# Patient Record
Sex: FEMALE | Race: BLACK OR AFRICAN AMERICAN | Employment: FULL TIME | URBAN - METROPOLITAN AREA
[De-identification: names, ages, dates, MRNs, and addresses within clinical notes are randomized per-mention and may not be internally consistent; named-entity substitution may affect disease eponyms.]

---

## 2017-02-07 ENCOUNTER — APPOINTMENT (OUTPATIENT)
Dept: ULTRASOUND IMAGING | Facility: HOSPITAL | Age: 62
End: 2017-02-07
Attending: EMERGENCY MEDICINE
Payer: COMMERCIAL

## 2017-02-07 ENCOUNTER — HOSPITAL ENCOUNTER (EMERGENCY)
Facility: HOSPITAL | Age: 62
Discharge: HOME | End: 2017-02-07
Attending: EMERGENCY MEDICINE
Payer: COMMERCIAL

## 2017-02-07 VITALS
RESPIRATION RATE: 14 BRPM | WEIGHT: 224 LBS | HEIGHT: 62 IN | HEART RATE: 72 BPM | TEMPERATURE: 99 F | DIASTOLIC BLOOD PRESSURE: 65 MMHG | SYSTOLIC BLOOD PRESSURE: 130 MMHG | OXYGEN SATURATION: 99 % | BODY MASS INDEX: 41.22 KG/M2

## 2017-02-07 DIAGNOSIS — M25.511 RIGHT SHOULDER PAIN, UNSPECIFIED CHRONICITY: Primary | ICD-10-CM

## 2017-02-07 PROCEDURE — 93971 EXTREMITY STUDY: CPT

## 2017-02-07 PROCEDURE — 99284 EMERGENCY DEPT VISIT MOD MDM: CPT

## 2017-02-07 NOTE — ED INITIAL ASSESSMENT (HPI)
Pt presents today with discomfort above her right breast in her subclavian area. She had a Port in place that was removed last Thursday and she said that she was on Lovenox and then they switched over to the Xarelto PO on the following day.  Pt rec'd chemo

## 2017-02-07 NOTE — ED PROVIDER NOTES
Patient Seen in: [facility] Emergency Department    History   Patient presents with:  Deep Vein Thrombosis (cardiovascular)    Stated Complaint:     HPI    Patient is a pleasant 69-year-old female, presenting for evaluation of possible DVT in her ri needed for pain. PACLitaxel (TAXOL IV),  Inject into the vein every 21 days. CARBOPLATIN IV,  Inject into the vein.   magnesium hydroxide (MILK OF MAGNESIA) 400 MG/5ML Oral Suspension,  Take 30 mL by mouth daily as needed for constipation.        Family Skin is pink warm and dry. There are no rashes. EXTREMITIES: The patient moves all 4 extremities freely. No cyanosis, clubbing, or edema. NEUROLOGIC: The patient is awake, alert, and oriented x3. Cranial nerves are grossly intact.  There is no gross mo discussed. Patient verbalizes understanding, ambulated freely, and was subsequently discharged without incident.     Disposition and Plan     Clinical Impression:  Right shoulder pain, unspecified chronicity  (primary encounter diagnosis)    Disposition:

## 2018-01-01 ENCOUNTER — APPOINTMENT (OUTPATIENT)
Dept: CT IMAGING | Facility: HOSPITAL | Age: 63
End: 2018-01-01
Attending: EMERGENCY MEDICINE
Payer: COMMERCIAL

## 2018-01-01 ENCOUNTER — HOSPITAL ENCOUNTER (EMERGENCY)
Facility: HOSPITAL | Age: 63
Discharge: HOME | End: 2018-01-01
Attending: EMERGENCY MEDICINE
Payer: COMMERCIAL

## 2018-01-01 VITALS
HEART RATE: 98 BPM | TEMPERATURE: 97 F | SYSTOLIC BLOOD PRESSURE: 108 MMHG | BODY MASS INDEX: 38 KG/M2 | RESPIRATION RATE: 16 BRPM | WEIGHT: 209 LBS | OXYGEN SATURATION: 99 % | DIASTOLIC BLOOD PRESSURE: 68 MMHG

## 2018-01-01 DIAGNOSIS — R18.0 ASCITES, MALIGNANT: ICD-10-CM

## 2018-01-01 DIAGNOSIS — R63.0 ANOREXIA: ICD-10-CM

## 2018-01-01 DIAGNOSIS — C54.1 ENDOMETRIAL CANCER (HCC): Primary | ICD-10-CM

## 2018-01-01 PROCEDURE — 99285 EMERGENCY DEPT VISIT HI MDM: CPT

## 2018-01-01 PROCEDURE — 74177 CT ABD & PELVIS W/CONTRAST: CPT | Performed by: EMERGENCY MEDICINE

## 2018-01-01 PROCEDURE — 81001 URINALYSIS AUTO W/SCOPE: CPT | Performed by: INTERNAL MEDICINE

## 2018-01-01 PROCEDURE — 99284 EMERGENCY DEPT VISIT MOD MDM: CPT

## 2018-01-01 PROCEDURE — 36415 COLL VENOUS BLD VENIPUNCTURE: CPT

## 2018-01-01 PROCEDURE — 87493 C DIFF AMPLIFIED PROBE: CPT | Performed by: INTERNAL MEDICINE

## 2018-01-01 PROCEDURE — 87045 FECES CULTURE AEROBIC BACT: CPT | Performed by: INTERNAL MEDICINE

## 2018-01-01 PROCEDURE — 87046 STOOL CULTR AEROBIC BACT EA: CPT | Performed by: INTERNAL MEDICINE

## 2018-01-01 PROCEDURE — 87427 SHIGA-LIKE TOXIN AG IA: CPT | Performed by: INTERNAL MEDICINE

## 2018-01-01 PROCEDURE — 80053 COMPREHEN METABOLIC PANEL: CPT | Performed by: EMERGENCY MEDICINE

## 2018-01-01 PROCEDURE — 85025 COMPLETE CBC W/AUTO DIFF WBC: CPT | Performed by: EMERGENCY MEDICINE

## 2018-01-01 RX ORDER — SUCRALFATE ORAL 1 G/10ML
1 SUSPENSION ORAL
Qty: 420 ML | Refills: 0 | Status: SHIPPED | OUTPATIENT
Start: 2018-01-01 | End: 2019-01-01

## 2018-11-24 NOTE — ED PROVIDER NOTES
Patient Seen in: [facility] Emergency Department    History   Patient presents with:  Dehydration (metabolic/constitutional)    Stated Complaint: dehydaration, abdominal distention    HPI    39-year-old female presents emergency department who has n signs reviewed. All other systems reviewed and negative except as noted above.     Physical Exam     ED Triage Vitals   BP 11/23/18 1613 104/78   Pulse 11/23/18 1601 118   Resp 11/23/18 1601 20   Temp 11/23/18 1601 97.3 °F (36.3 °C)   Temp src 11/23/18 Abnormality         Status                     ---------                               -----------         ------                     CBC W/ DIFFERENTIAL[[id number]]          Abnormal            Final result                 Please v Radha 329 [phone]              Medications Prescribed:  Current Discharge Medication List    START taking these medications    sucralfate 1 GM/10ML Oral Suspension  Take 10 mL (1 g total) by mouth 4 (four) times daily before meals and

## 2019-01-01 ENCOUNTER — APPOINTMENT (OUTPATIENT)
Dept: GENERAL RADIOLOGY | Facility: HOSPITAL | Age: 64
DRG: 186 | End: 2019-01-01
Attending: RADIOLOGY
Payer: COMMERCIAL

## 2019-01-01 ENCOUNTER — APPOINTMENT (OUTPATIENT)
Dept: GENERAL RADIOLOGY | Facility: HOSPITAL | Age: 64
DRG: 186 | End: 2019-01-01
Attending: EMERGENCY MEDICINE
Payer: COMMERCIAL

## 2019-01-01 ENCOUNTER — APPOINTMENT (OUTPATIENT)
Dept: GENERAL RADIOLOGY | Facility: HOSPITAL | Age: 64
DRG: 186 | End: 2019-01-01
Attending: INTERNAL MEDICINE
Payer: COMMERCIAL

## 2019-01-01 ENCOUNTER — NURSE ONLY (OUTPATIENT)
Dept: LAB | Age: 64
End: 2019-01-01
Attending: FAMILY MEDICINE
Payer: COMMERCIAL

## 2019-01-01 ENCOUNTER — APPOINTMENT (OUTPATIENT)
Dept: GENERAL RADIOLOGY | Facility: HOSPITAL | Age: 64
DRG: 754 | End: 2019-01-01
Attending: INTERNAL MEDICINE
Payer: COMMERCIAL

## 2019-01-01 ENCOUNTER — APPOINTMENT (OUTPATIENT)
Dept: CV DIAGNOSTICS | Facility: HOSPITAL | Age: 64
DRG: 186 | End: 2019-01-01
Attending: INTERNAL MEDICINE
Payer: COMMERCIAL

## 2019-01-01 ENCOUNTER — SNF VISIT (OUTPATIENT)
Dept: INTERNAL MEDICINE CLINIC | Age: 64
End: 2019-01-01

## 2019-01-01 ENCOUNTER — HOSPITAL ENCOUNTER (INPATIENT)
Facility: HOSPITAL | Age: 64
LOS: 19 days | Discharge: HOSPICE | DRG: 186 | End: 2019-01-01
Attending: EMERGENCY MEDICINE | Admitting: INTERNAL MEDICINE
Payer: COMMERCIAL

## 2019-01-01 ENCOUNTER — APPOINTMENT (OUTPATIENT)
Dept: GENERAL RADIOLOGY | Facility: HOSPITAL | Age: 64
DRG: 754 | End: 2019-01-01
Attending: EMERGENCY MEDICINE
Payer: COMMERCIAL

## 2019-01-01 ENCOUNTER — APPOINTMENT (OUTPATIENT)
Dept: ULTRASOUND IMAGING | Facility: HOSPITAL | Age: 64
DRG: 754 | End: 2019-01-01
Attending: INTERNAL MEDICINE
Payer: COMMERCIAL

## 2019-01-01 ENCOUNTER — TELEPHONE (OUTPATIENT)
Dept: HEMATOLOGY/ONCOLOGY | Facility: HOSPITAL | Age: 64
End: 2019-01-01

## 2019-01-01 ENCOUNTER — INITIAL APN SNF VISIT (OUTPATIENT)
Dept: INTERNAL MEDICINE CLINIC | Age: 64
End: 2019-01-01

## 2019-01-01 ENCOUNTER — APPOINTMENT (OUTPATIENT)
Dept: ULTRASOUND IMAGING | Facility: HOSPITAL | Age: 64
DRG: 754 | End: 2019-01-01
Attending: HOSPITALIST
Payer: COMMERCIAL

## 2019-01-01 ENCOUNTER — APPOINTMENT (OUTPATIENT)
Dept: ULTRASOUND IMAGING | Facility: HOSPITAL | Age: 64
DRG: 186 | End: 2019-01-01
Attending: INTERNAL MEDICINE
Payer: COMMERCIAL

## 2019-01-01 ENCOUNTER — APPOINTMENT (OUTPATIENT)
Dept: INTERVENTIONAL RADIOLOGY/VASCULAR | Facility: HOSPITAL | Age: 64
DRG: 186 | End: 2019-01-01
Attending: INTERNAL MEDICINE
Payer: COMMERCIAL

## 2019-01-01 ENCOUNTER — APPOINTMENT (OUTPATIENT)
Dept: CT IMAGING | Facility: HOSPITAL | Age: 64
DRG: 186 | End: 2019-01-01
Attending: EMERGENCY MEDICINE
Payer: COMMERCIAL

## 2019-01-01 ENCOUNTER — APPOINTMENT (OUTPATIENT)
Dept: CV DIAGNOSTICS | Facility: HOSPITAL | Age: 64
DRG: 808 | End: 2019-01-01
Attending: HOSPITALIST
Payer: COMMERCIAL

## 2019-01-01 ENCOUNTER — APPOINTMENT (OUTPATIENT)
Dept: ULTRASOUND IMAGING | Facility: HOSPITAL | Age: 64
DRG: 808 | End: 2019-01-01
Attending: HOSPITALIST
Payer: COMMERCIAL

## 2019-01-01 ENCOUNTER — APPOINTMENT (OUTPATIENT)
Dept: GENERAL RADIOLOGY | Facility: HOSPITAL | Age: 64
DRG: 808 | End: 2019-01-01
Attending: EMERGENCY MEDICINE
Payer: COMMERCIAL

## 2019-01-01 ENCOUNTER — APPOINTMENT (OUTPATIENT)
Dept: CT IMAGING | Facility: HOSPITAL | Age: 64
DRG: 808 | End: 2019-01-01
Attending: HOSPITALIST
Payer: COMMERCIAL

## 2019-01-01 ENCOUNTER — HOSPITAL ENCOUNTER (INPATIENT)
Facility: HOSPITAL | Age: 64
LOS: 11 days | Discharge: NURSING, REHAB OR LONG-TERM CARE | DRG: 754 | End: 2019-01-01
Attending: EMERGENCY MEDICINE | Admitting: INTERNAL MEDICINE
Payer: COMMERCIAL

## 2019-01-01 ENCOUNTER — APPOINTMENT (OUTPATIENT)
Dept: HEMATOLOGY/ONCOLOGY | Facility: HOSPITAL | Age: 64
End: 2019-01-01
Attending: OBSTETRICS & GYNECOLOGY
Payer: COMMERCIAL

## 2019-01-01 ENCOUNTER — HOSPITAL ENCOUNTER (INPATIENT)
Facility: HOSPITAL | Age: 64
LOS: 1 days | DRG: 186 | End: 2019-01-01
Attending: INTERNAL MEDICINE | Admitting: INTERNAL MEDICINE
Payer: OTHER MISCELLANEOUS

## 2019-01-01 ENCOUNTER — HOSPITAL ENCOUNTER (INPATIENT)
Facility: HOSPITAL | Age: 64
LOS: 2 days | Discharge: HOME HEALTH | DRG: 808 | End: 2019-01-01
Attending: EMERGENCY MEDICINE | Admitting: INTERNAL MEDICINE
Payer: COMMERCIAL

## 2019-01-01 ENCOUNTER — APPOINTMENT (OUTPATIENT)
Dept: INTERVENTIONAL RADIOLOGY/VASCULAR | Facility: HOSPITAL | Age: 64
DRG: 754 | End: 2019-01-01
Attending: HOSPITALIST
Payer: COMMERCIAL

## 2019-01-01 VITALS
OXYGEN SATURATION: 98 % | SYSTOLIC BLOOD PRESSURE: 73 MMHG | HEART RATE: 104 BPM | RESPIRATION RATE: 18 BRPM | DIASTOLIC BLOOD PRESSURE: 50 MMHG | TEMPERATURE: 99 F

## 2019-01-01 VITALS
HEIGHT: 62 IN | OXYGEN SATURATION: 96 % | WEIGHT: 183 LBS | RESPIRATION RATE: 16 BRPM | BODY MASS INDEX: 33.68 KG/M2 | HEART RATE: 84 BPM | TEMPERATURE: 98 F | DIASTOLIC BLOOD PRESSURE: 65 MMHG | SYSTOLIC BLOOD PRESSURE: 105 MMHG

## 2019-01-01 VITALS
RESPIRATION RATE: 18 BRPM | SYSTOLIC BLOOD PRESSURE: 102 MMHG | HEART RATE: 112 BPM | OXYGEN SATURATION: 95 % | TEMPERATURE: 97 F | DIASTOLIC BLOOD PRESSURE: 66 MMHG

## 2019-01-01 VITALS
BODY MASS INDEX: 32 KG/M2 | RESPIRATION RATE: 16 BRPM | OXYGEN SATURATION: 96 % | WEIGHT: 173.31 LBS | SYSTOLIC BLOOD PRESSURE: 90 MMHG | TEMPERATURE: 98 F | HEART RATE: 112 BPM | DIASTOLIC BLOOD PRESSURE: 54 MMHG

## 2019-01-01 VITALS
DIASTOLIC BLOOD PRESSURE: 74 MMHG | OXYGEN SATURATION: 93 % | TEMPERATURE: 98 F | RESPIRATION RATE: 20 BRPM | SYSTOLIC BLOOD PRESSURE: 111 MMHG | HEART RATE: 93 BPM | BODY MASS INDEX: 35 KG/M2 | WEIGHT: 188.69 LBS

## 2019-01-01 VITALS
DIASTOLIC BLOOD PRESSURE: 54 MMHG | BODY MASS INDEX: 34.41 KG/M2 | RESPIRATION RATE: 18 BRPM | HEIGHT: 62 IN | SYSTOLIC BLOOD PRESSURE: 97 MMHG | HEART RATE: 113 BPM | OXYGEN SATURATION: 93 % | TEMPERATURE: 98 F | WEIGHT: 187 LBS

## 2019-01-01 DIAGNOSIS — R53.1 WEAKNESS: Primary | ICD-10-CM

## 2019-01-01 DIAGNOSIS — R18.0 ASCITES, MALIGNANT: ICD-10-CM

## 2019-01-01 DIAGNOSIS — R63.0 POOR APPETITE: ICD-10-CM

## 2019-01-01 DIAGNOSIS — C55 MALIGNANT NEOPLASM OF UTERUS, UNSPECIFIED SITE (HCC): ICD-10-CM

## 2019-01-01 DIAGNOSIS — R53.1 WEAKNESS GENERALIZED: ICD-10-CM

## 2019-01-01 DIAGNOSIS — E86.0 DEHYDRATION: ICD-10-CM

## 2019-01-01 DIAGNOSIS — R34 DECREASED URINE OUTPUT: ICD-10-CM

## 2019-01-01 DIAGNOSIS — E87.6 HYPOKALEMIA: ICD-10-CM

## 2019-01-01 DIAGNOSIS — I26.99 PULMONARY EMBOLISM WITHOUT ACUTE COR PULMONALE, UNSPECIFIED CHRONICITY, UNSPECIFIED PULMONARY EMBOLISM TYPE (HCC): ICD-10-CM

## 2019-01-01 DIAGNOSIS — D61.818 PANCYTOPENIA (HCC): Primary | ICD-10-CM

## 2019-01-01 DIAGNOSIS — R00.2 PALPITATIONS: ICD-10-CM

## 2019-01-01 DIAGNOSIS — K21.00 GASTROESOPHAGEAL REFLUX DISEASE WITH ESOPHAGITIS: ICD-10-CM

## 2019-01-01 DIAGNOSIS — R63.0 ANOREXIA: Primary | ICD-10-CM

## 2019-01-01 DIAGNOSIS — Z51.5 QUALITY OF LIFE PALLIATIVE CARE ENCOUNTER: ICD-10-CM

## 2019-01-01 DIAGNOSIS — D64.9 ANEMIA, UNSPECIFIED TYPE: ICD-10-CM

## 2019-01-01 DIAGNOSIS — K21.9 GASTROESOPHAGEAL REFLUX DISEASE WITHOUT ESOPHAGITIS: ICD-10-CM

## 2019-01-01 DIAGNOSIS — J94.8 HYDROPNEUMOTHORAX: Primary | ICD-10-CM

## 2019-01-01 DIAGNOSIS — E86.0 DEHYDRATION: Primary | ICD-10-CM

## 2019-01-01 DIAGNOSIS — C54.1 ENDOMETRIAL CANCER (HCC): ICD-10-CM

## 2019-01-01 DIAGNOSIS — Z71.89 GOALS OF CARE, COUNSELING/DISCUSSION: ICD-10-CM

## 2019-01-01 LAB
ALBUMIN SERPL-MCNC: 1.3 G/DL (ref 3.1–4.5)
ALBUMIN SERPL-MCNC: 1.5 G/DL (ref 3.1–4.5)
ALBUMIN SERPL-MCNC: 1.6 G/DL (ref 3.4–5)
ALBUMIN SERPL-MCNC: 1.7 G/DL (ref 3.1–4.5)
ALBUMIN SERPL-MCNC: 1.7 G/DL (ref 3.4–5)
ALBUMIN SERPL-MCNC: 1.7 G/DL (ref 3.4–5)
ALBUMIN SERPL-MCNC: 1.8 G/DL (ref 3.4–5)
ALBUMIN SERPL-MCNC: 1.9 G/DL (ref 3.1–4.5)
ALBUMIN SERPL-MCNC: 2 G/DL (ref 3.1–4.5)
ALBUMIN SERPL-MCNC: 2.1 G/DL (ref 3.1–4.5)
ALBUMIN SERPL-MCNC: 2.1 G/DL (ref 3.4–5)
ALBUMIN SERPL-MCNC: 2.2 G/DL (ref 3.1–4.5)
ALBUMIN SERPL-MCNC: 2.4 G/DL (ref 3.1–4.5)
ALBUMIN SERPL-MCNC: 2.4 G/DL (ref 3.1–4.5)
ALBUMIN/GLOB SERPL: 0.3 {RATIO} (ref 1–2)
ALBUMIN/GLOB SERPL: 0.4 {RATIO} (ref 1–2)
ALBUMIN/GLOB SERPL: 0.5 {RATIO} (ref 1–2)
ALP LIVER SERPL-CCNC: 109 U/L (ref 50–130)
ALP LIVER SERPL-CCNC: 119 U/L (ref 50–130)
ALP LIVER SERPL-CCNC: 74 U/L (ref 50–130)
ALP LIVER SERPL-CCNC: 86 U/L (ref 50–130)
ALP LIVER SERPL-CCNC: 90 U/L (ref 50–130)
ALP LIVER SERPL-CCNC: 90 U/L (ref 50–130)
ALT SERPL-CCNC: 10 U/L (ref 13–56)
ALT SERPL-CCNC: 11 U/L (ref 13–56)
ALT SERPL-CCNC: 21 U/L (ref 14–54)
ALT SERPL-CCNC: 24 U/L (ref 14–54)
ALT SERPL-CCNC: 29 U/L (ref 14–54)
ALT SERPL-CCNC: 9 U/L (ref 13–56)
ANION GAP SERPL CALC-SCNC: 10 MMOL/L (ref 0–18)
ANION GAP SERPL CALC-SCNC: 11 MMOL/L (ref 0–18)
ANION GAP SERPL CALC-SCNC: 6 MMOL/L (ref 0–18)
ANION GAP SERPL CALC-SCNC: 7 MMOL/L (ref 0–18)
ANION GAP SERPL CALC-SCNC: 8 MMOL/L (ref 0–18)
ANION GAP SERPL CALC-SCNC: 9 MMOL/L (ref 0–18)
ANTIBODY SCREEN: NEGATIVE
ANTIBODY SCREEN: NEGATIVE
APTT PPP: 134 SECONDS (ref 26.1–34.6)
APTT PPP: 152.7 SECONDS (ref 26.1–34.6)
APTT PPP: 161.8 SECONDS (ref 26.1–34.6)
APTT PPP: 165.9 SECONDS (ref 26.1–34.6)
APTT PPP: 220 SECONDS (ref 26.1–34.6)
APTT PPP: 254.2 SECONDS (ref 26.1–34.6)
APTT PPP: 272.8 SECONDS (ref 26.1–34.6)
APTT PPP: 29.1 SECONDS (ref 26.1–34.6)
APTT PPP: 33.9 SECONDS (ref 26.1–34.6)
APTT PPP: 35.3 SECONDS (ref 26.1–34.6)
APTT PPP: 35.5 SECONDS (ref 26.1–34.6)
APTT PPP: 37.4 SECONDS (ref 26.1–34.6)
APTT PPP: 38.4 SECONDS (ref 26.1–34.6)
APTT PPP: 43.9 SECONDS (ref 26.1–34.6)
APTT PPP: 48.9 SECONDS (ref 26.1–34.6)
APTT PPP: 99.5 SECONDS (ref 26.1–34.6)
APTT PPP: >300 SECONDS (ref 26.1–34.6)
AST SERPL-CCNC: 12 U/L (ref 15–37)
AST SERPL-CCNC: 15 U/L (ref 15–37)
AST SERPL-CCNC: 18 U/L (ref 15–37)
AST SERPL-CCNC: 21 U/L (ref 15–41)
AST SERPL-CCNC: 23 U/L (ref 15–41)
AST SERPL-CCNC: 25 U/L (ref 15–41)
ATRIAL RATE: 107 BPM
ATRIAL RATE: 107 BPM
ATRIAL RATE: 112 BPM
ATRIAL RATE: 115 BPM
ATRIAL RATE: 122 BPM
ATRIAL RATE: 125 BPM
BASOPHIL PLEURAL FLUID: 0 %
BASOPHILS # BLD AUTO: 0 X10(3) UL (ref 0–0.2)
BASOPHILS # BLD AUTO: 0.01 X10(3) UL (ref 0–0.1)
BASOPHILS # BLD AUTO: 0.01 X10(3) UL (ref 0–0.1)
BASOPHILS # BLD AUTO: 0.01 X10(3) UL (ref 0–0.2)
BASOPHILS # BLD AUTO: 0.02 X10(3) UL (ref 0–0.2)
BASOPHILS NFR BLD AUTO: 0 %
BASOPHILS NFR BLD AUTO: 0.1 %
BASOPHILS NFR BLD AUTO: 0.1 %
BASOPHILS NFR BLD AUTO: 0.2 %
BASOPHILS NFR BLD AUTO: 0.4 %
BASOPHILS NFR BLD AUTO: 0.6 %
BASOPHILS NFR BLD AUTO: 0.7 %
BILIRUB DIRECT SERPL-MCNC: 0.2 MG/DL (ref 0–0.2)
BILIRUB SERPL-MCNC: 0.3 MG/DL (ref 0.1–2)
BILIRUB SERPL-MCNC: 0.3 MG/DL (ref 0.1–2)
BILIRUB SERPL-MCNC: 0.4 MG/DL (ref 0.1–2)
BILIRUB SERPL-MCNC: 0.4 MG/DL (ref 0.1–2)
BILIRUB SERPL-MCNC: 0.5 MG/DL (ref 0.1–2)
BILIRUB SERPL-MCNC: 0.6 MG/DL (ref 0.1–2)
BILIRUB UR QL STRIP.AUTO: NEGATIVE
BLOOD TYPE BARCODE: 7300
BLOOD TYPE BARCODE: 7300
BUN BLD-MCNC: 12 MG/DL (ref 8–20)
BUN BLD-MCNC: 12 MG/DL (ref 8–20)
BUN BLD-MCNC: 13 MG/DL (ref 8–20)
BUN BLD-MCNC: 14 MG/DL (ref 7–18)
BUN BLD-MCNC: 16 MG/DL (ref 7–18)
BUN BLD-MCNC: 16 MG/DL (ref 7–18)
BUN BLD-MCNC: 17 MG/DL (ref 7–18)
BUN BLD-MCNC: 17 MG/DL (ref 7–18)
BUN BLD-MCNC: 28 MG/DL (ref 7–18)
BUN BLD-MCNC: 3 MG/DL (ref 8–20)
BUN BLD-MCNC: 4 MG/DL (ref 8–20)
BUN BLD-MCNC: 4 MG/DL (ref 8–20)
BUN BLD-MCNC: 5 MG/DL (ref 8–20)
BUN BLD-MCNC: 5 MG/DL (ref 8–20)
BUN BLD-MCNC: 6 MG/DL (ref 8–20)
BUN BLD-MCNC: 6 MG/DL (ref 8–20)
BUN BLD-MCNC: 7 MG/DL (ref 8–20)
BUN BLD-MCNC: 8 MG/DL (ref 8–20)
BUN BLD-MCNC: 9 MG/DL (ref 7–18)
BUN BLD-MCNC: 9 MG/DL (ref 8–20)
BUN/CREAT SERPL: 12.1 (ref 10–20)
BUN/CREAT SERPL: 13 (ref 10–20)
BUN/CREAT SERPL: 16.2 (ref 10–20)
BUN/CREAT SERPL: 17.1 (ref 10–20)
BUN/CREAT SERPL: 18.2 (ref 10–20)
BUN/CREAT SERPL: 18.5 (ref 10–20)
BUN/CREAT SERPL: 21.1 (ref 10–20)
BUN/CREAT SERPL: 21.4 (ref 10–20)
BUN/CREAT SERPL: 23.7 (ref 10–20)
BUN/CREAT SERPL: 23.8 (ref 10–20)
BUN/CREAT SERPL: 25 (ref 10–20)
BUN/CREAT SERPL: 27.9 (ref 10–20)
BUN/CREAT SERPL: 32.1 (ref 10–20)
BUN/CREAT SERPL: 33.3 (ref 10–20)
BUN/CREAT SERPL: 37.5 (ref 10–20)
BUN/CREAT SERPL: 37.8 (ref 10–20)
BUN/CREAT SERPL: 39 (ref 10–20)
BUN/CREAT SERPL: 40 (ref 10–20)
BUN/CREAT SERPL: 45.9 (ref 10–20)
BUN/CREAT SERPL: 46.7 (ref 10–20)
CALCIUM BLD-MCNC: 7.6 MG/DL (ref 8.3–10.3)
CALCIUM BLD-MCNC: 7.8 MG/DL (ref 8.3–10.3)
CALCIUM BLD-MCNC: 7.9 MG/DL (ref 8.3–10.3)
CALCIUM BLD-MCNC: 7.9 MG/DL (ref 8.3–10.3)
CALCIUM BLD-MCNC: 8 MG/DL (ref 8.3–10.3)
CALCIUM BLD-MCNC: 8.1 MG/DL (ref 8.3–10.3)
CALCIUM BLD-MCNC: 8.1 MG/DL (ref 8.3–10.3)
CALCIUM BLD-MCNC: 8.2 MG/DL (ref 8.3–10.3)
CALCIUM BLD-MCNC: 8.3 MG/DL (ref 8.3–10.3)
CALCIUM BLD-MCNC: 8.3 MG/DL (ref 8.3–10.3)
CALCIUM BLD-MCNC: 8.4 MG/DL (ref 8.3–10.3)
CALCIUM BLD-MCNC: 8.5 MG/DL (ref 8.3–10.3)
CALCIUM BLD-MCNC: 8.6 MG/DL (ref 8.3–10.3)
CALCIUM BLD-MCNC: 8.7 MG/DL (ref 8.5–10.1)
CALCIUM BLD-MCNC: 8.8 MG/DL (ref 8.5–10.1)
CALCIUM BLD-MCNC: 9.1 MG/DL (ref 8.5–10.1)
CALCIUM BLD-MCNC: 9.3 MG/DL (ref 8.5–10.1)
CALCIUM BLD-MCNC: 9.4 MG/DL (ref 8.5–10.1)
CHLORIDE SERPL-SCNC: 103 MMOL/L (ref 101–111)
CHLORIDE SERPL-SCNC: 105 MMOL/L (ref 101–111)
CHLORIDE SERPL-SCNC: 105 MMOL/L (ref 98–107)
CHLORIDE SERPL-SCNC: 107 MMOL/L (ref 101–111)
CHLORIDE SERPL-SCNC: 108 MMOL/L (ref 98–107)
CHLORIDE SERPL-SCNC: 108 MMOL/L (ref 98–107)
CHLORIDE SERPL-SCNC: 109 MMOL/L (ref 98–107)
CHLORIDE SERPL-SCNC: 109 MMOL/L (ref 98–107)
CHLORIDE SERPL-SCNC: 110 MMOL/L (ref 98–107)
CHLORIDE SERPL-SCNC: 110 MMOL/L (ref 98–107)
CHLORIDE SERPL-SCNC: 111 MMOL/L (ref 101–111)
CHLORIDE SERPL-SCNC: 112 MMOL/L (ref 101–111)
CHLORIDE SERPL-SCNC: 113 MMOL/L (ref 101–111)
CHLORIDE SERPL-SCNC: 114 MMOL/L (ref 101–111)
CHLORIDE SERPL-SCNC: 115 MMOL/L (ref 101–111)
CO2 SERPL-SCNC: 20 MMOL/L (ref 22–32)
CO2 SERPL-SCNC: 22 MMOL/L (ref 22–32)
CO2 SERPL-SCNC: 22 MMOL/L (ref 22–32)
CO2 SERPL-SCNC: 23 MMOL/L (ref 22–32)
CO2 SERPL-SCNC: 23 MMOL/L (ref 22–32)
CO2 SERPL-SCNC: 24 MMOL/L (ref 22–32)
CO2 SERPL-SCNC: 25 MMOL/L (ref 22–32)
CO2 SERPL-SCNC: 25 MMOL/L (ref 22–32)
CO2 SERPL-SCNC: 26 MMOL/L (ref 21–32)
CO2 SERPL-SCNC: 26 MMOL/L (ref 22–32)
CO2 SERPL-SCNC: 27 MMOL/L (ref 21–32)
CO2 SERPL-SCNC: 27 MMOL/L (ref 22–32)
CO2 SERPL-SCNC: 28 MMOL/L (ref 21–32)
CO2 SERPL-SCNC: 28 MMOL/L (ref 22–32)
CO2 SERPL-SCNC: 29 MMOL/L (ref 21–32)
CORTIS SERPL-MCNC: 29.4 UG/DL
CREAT BLD-MCNC: 0.21 MG/DL (ref 0.55–1.02)
CREAT BLD-MCNC: 0.22 MG/DL (ref 0.55–1.02)
CREAT BLD-MCNC: 0.23 MG/DL (ref 0.55–1.02)
CREAT BLD-MCNC: 0.27 MG/DL (ref 0.55–1.02)
CREAT BLD-MCNC: 0.28 MG/DL (ref 0.55–1.02)
CREAT BLD-MCNC: 0.3 MG/DL (ref 0.55–1.02)
CREAT BLD-MCNC: 0.32 MG/DL (ref 0.55–1.02)
CREAT BLD-MCNC: 0.33 MG/DL (ref 0.55–1.02)
CREAT BLD-MCNC: 0.37 MG/DL (ref 0.55–1.02)
CREAT BLD-MCNC: 0.37 MG/DL (ref 0.55–1.02)
CREAT BLD-MCNC: 0.38 MG/DL (ref 0.55–1.02)
CREAT BLD-MCNC: 0.38 MG/DL (ref 0.55–1.02)
CREAT BLD-MCNC: 0.4 MG/DL (ref 0.55–1.02)
CREAT BLD-MCNC: 0.41 MG/DL (ref 0.55–1.02)
CREAT BLD-MCNC: 0.43 MG/DL (ref 0.55–1.02)
CREAT BLD-MCNC: 0.45 MG/DL (ref 0.55–1.02)
CREAT BLD-MCNC: 0.76 MG/DL (ref 0.55–1.02)
CREAT BLD-MCNC: 0.84 MG/DL (ref 0.55–1.02)
D-DIMER: 2.88 UG/ML FEU (ref 0–0.49)
DEPRECATED RDW RBC AUTO: 57.1 FL (ref 35.1–46.3)
DEPRECATED RDW RBC AUTO: 58 FL (ref 35.1–46.3)
DEPRECATED RDW RBC AUTO: 61.7 FL (ref 35.1–46.3)
DEPRECATED RDW RBC AUTO: 62.3 FL (ref 35.1–46.3)
DEPRECATED RDW RBC AUTO: 62.8 FL (ref 35.1–46.3)
DEPRECATED RDW RBC AUTO: 63.6 FL (ref 35.1–46.3)
DEPRECATED RDW RBC AUTO: 64.7 FL (ref 35.1–46.3)
DEPRECATED RDW RBC AUTO: 67.9 FL (ref 35.1–46.3)
DEPRECATED RDW RBC AUTO: 71.6 FL (ref 35.1–46.3)
DEPRECATED RDW RBC AUTO: 71.9 FL (ref 35.1–46.3)
DEPRECATED RDW RBC AUTO: 72.3 FL (ref 35.1–46.3)
DEPRECATED RDW RBC AUTO: 74 FL (ref 35.1–46.3)
EOSINOPHIL # BLD AUTO: 0 X10(3) UL (ref 0–0.3)
EOSINOPHIL # BLD AUTO: 0 X10(3) UL (ref 0–0.7)
EOSINOPHIL # BLD AUTO: 0 X10(3) UL (ref 0–0.7)
EOSINOPHIL # BLD AUTO: 0.01 X10(3) UL (ref 0–0.3)
EOSINOPHIL # BLD AUTO: 0.01 X10(3) UL (ref 0–0.7)
EOSINOPHIL # BLD AUTO: 0.02 X10(3) UL (ref 0–0.7)
EOSINOPHIL # BLD AUTO: 0.02 X10(3) UL (ref 0–0.7)
EOSINOPHIL # BLD AUTO: 0.03 X10(3) UL (ref 0–0.7)
EOSINOPHIL # BLD AUTO: 0.03 X10(3) UL (ref 0–0.7)
EOSINOPHIL # BLD AUTO: 0.04 X10(3) UL (ref 0–0.7)
EOSINOPHIL NFR BLD AUTO: 0 %
EOSINOPHIL NFR BLD AUTO: 0.1 %
EOSINOPHIL NFR BLD AUTO: 0.2 %
EOSINOPHIL NFR BLD AUTO: 0.2 %
EOSINOPHIL NFR BLD AUTO: 0.3 %
EOSINOPHIL NFR BLD AUTO: 0.5 %
EOSINOPHIL NFR BLD AUTO: 0.6 %
EOSINOPHIL NFR BLD AUTO: 0.6 %
EOSINOPHIL NFR BLD AUTO: 0.8 %
EOSINOPHIL NFR BLD AUTO: 0.9 %
EOSINOPHIL PLEURAL FLUID: 0 %
ERYTHROCYTE [DISTWIDTH] IN BLOOD BY AUTOMATED COUNT: 19.1 % (ref 11.5–16)
ERYTHROCYTE [DISTWIDTH] IN BLOOD BY AUTOMATED COUNT: 20.2 % (ref 11–15)
ERYTHROCYTE [DISTWIDTH] IN BLOOD BY AUTOMATED COUNT: 20.4 % (ref 11–15)
ERYTHROCYTE [DISTWIDTH] IN BLOOD BY AUTOMATED COUNT: 20.9 % (ref 11–15)
ERYTHROCYTE [DISTWIDTH] IN BLOOD BY AUTOMATED COUNT: 21.1 % (ref 11.5–16)
ERYTHROCYTE [DISTWIDTH] IN BLOOD BY AUTOMATED COUNT: 21.1 % (ref 11–15)
ERYTHROCYTE [DISTWIDTH] IN BLOOD BY AUTOMATED COUNT: 21.2 % (ref 11–15)
ERYTHROCYTE [DISTWIDTH] IN BLOOD BY AUTOMATED COUNT: 21.6 % (ref 11–15)
ERYTHROCYTE [DISTWIDTH] IN BLOOD BY AUTOMATED COUNT: 21.6 % (ref 11–15)
ERYTHROCYTE [DISTWIDTH] IN BLOOD BY AUTOMATED COUNT: 23.5 % (ref 11–15)
ERYTHROCYTE [DISTWIDTH] IN BLOOD BY AUTOMATED COUNT: 23.6 % (ref 11–15)
ERYTHROCYTE [DISTWIDTH] IN BLOOD BY AUTOMATED COUNT: 23.8 % (ref 11–15)
ERYTHROCYTE [DISTWIDTH] IN BLOOD BY AUTOMATED COUNT: 24.2 % (ref 11–15)
ERYTHROCYTE [DISTWIDTH] IN BLOOD BY AUTOMATED COUNT: 24.3 % (ref 11–15)
GLOBULIN PLAS-MCNC: 3.2 G/DL (ref 2.8–4.4)
GLOBULIN PLAS-MCNC: 3.2 G/DL (ref 2.8–4.4)
GLOBULIN PLAS-MCNC: 3.6 G/DL (ref 2.8–4.4)
GLOBULIN PLAS-MCNC: 4 G/DL (ref 2.8–4.4)
GLOBULIN PLAS-MCNC: 4.4 G/DL (ref 2.8–4.4)
GLUCOSE BLD-MCNC: 103 MG/DL (ref 70–99)
GLUCOSE BLD-MCNC: 131 MG/DL (ref 70–99)
GLUCOSE BLD-MCNC: 74 MG/DL (ref 70–99)
GLUCOSE BLD-MCNC: 77 MG/DL (ref 70–99)
GLUCOSE BLD-MCNC: 77 MG/DL (ref 70–99)
GLUCOSE BLD-MCNC: 78 MG/DL (ref 70–99)
GLUCOSE BLD-MCNC: 79 MG/DL (ref 70–99)
GLUCOSE BLD-MCNC: 80 MG/DL (ref 70–99)
GLUCOSE BLD-MCNC: 80 MG/DL (ref 70–99)
GLUCOSE BLD-MCNC: 81 MG/DL (ref 70–99)
GLUCOSE BLD-MCNC: 83 MG/DL (ref 70–99)
GLUCOSE BLD-MCNC: 83 MG/DL (ref 70–99)
GLUCOSE BLD-MCNC: 86 MG/DL (ref 70–99)
GLUCOSE BLD-MCNC: 86 MG/DL (ref 70–99)
GLUCOSE BLD-MCNC: 88 MG/DL (ref 70–99)
GLUCOSE BLD-MCNC: 88 MG/DL (ref 70–99)
GLUCOSE BLD-MCNC: 89 MG/DL (ref 70–99)
GLUCOSE BLD-MCNC: 90 MG/DL (ref 70–99)
GLUCOSE BLD-MCNC: 91 MG/DL (ref 70–99)
GLUCOSE BLD-MCNC: 94 MG/DL (ref 70–99)
GLUCOSE UR STRIP.AUTO-MCNC: 50 MG/DL
GLUCOSE UR STRIP.AUTO-MCNC: NEGATIVE MG/DL
HAV IGM SER QL: 1.4 MG/DL (ref 1.6–2.6)
HAV IGM SER QL: 1.4 MG/DL (ref 1.8–2.5)
HAV IGM SER QL: 1.6 MG/DL (ref 1.6–2.6)
HAV IGM SER QL: 1.7 MG/DL (ref 1.6–2.6)
HAV IGM SER QL: 1.8 MG/DL (ref 1.6–2.6)
HAV IGM SER QL: 2 MG/DL (ref 1.6–2.6)
HAV IGM SER QL: 2.2 MG/DL (ref 1.6–2.6)
HCT VFR BLD AUTO: 20.6 % (ref 35–48)
HCT VFR BLD AUTO: 21.7 % (ref 34–50)
HCT VFR BLD AUTO: 22.4 % (ref 34–50)
HCT VFR BLD AUTO: 23.2 % (ref 35–48)
HCT VFR BLD AUTO: 23.7 % (ref 35–48)
HCT VFR BLD AUTO: 24.7 % (ref 35–48)
HCT VFR BLD AUTO: 25.2 % (ref 35–48)
HCT VFR BLD AUTO: 25.4 % (ref 35–48)
HCT VFR BLD AUTO: 25.5 % (ref 35–48)
HCT VFR BLD AUTO: 26.2 % (ref 35–48)
HCT VFR BLD AUTO: 27.1 % (ref 35–48)
HCT VFR BLD AUTO: 28 % (ref 35–48)
HCT VFR BLD AUTO: 28.7 % (ref 35–48)
HCT VFR BLD AUTO: 28.7 % (ref 35–48)
HGB BLD-MCNC: 6.8 G/DL (ref 12–16)
HGB BLD-MCNC: 7.1 G/DL (ref 12–16)
HGB BLD-MCNC: 7.4 G/DL (ref 12–16)
HGB BLD-MCNC: 7.7 G/DL (ref 12–16)
HGB BLD-MCNC: 7.9 G/DL (ref 12–16)
HGB BLD-MCNC: 8 G/DL (ref 12–16)
HGB BLD-MCNC: 8.2 G/DL (ref 12–16)
HGB BLD-MCNC: 8.4 G/DL (ref 12–16)
HGB BLD-MCNC: 8.5 G/DL (ref 12–16)
HGB BLD-MCNC: 8.5 G/DL (ref 12–16)
HGB BLD-MCNC: 9.1 G/DL (ref 12–16)
HGB BLD-MCNC: 9.3 G/DL (ref 12–16)
HGB BLD-MCNC: 9.7 G/DL (ref 12–16)
HGB BLD-MCNC: 9.8 G/DL (ref 12–16)
HYALINE CASTS #/AREA URNS AUTO: PRESENT /LPF
IMM GRANULOCYTES # BLD AUTO: 0.01 X10(3) UL (ref 0–1)
IMM GRANULOCYTES # BLD AUTO: 0.02 X10(3) UL (ref 0–1)
IMM GRANULOCYTES # BLD AUTO: 0.02 X10(3) UL (ref 0–1)
IMM GRANULOCYTES # BLD AUTO: 0.03 X10(3) UL (ref 0–1)
IMM GRANULOCYTES # BLD AUTO: 0.04 X10(3) UL (ref 0–1)
IMM GRANULOCYTES # BLD AUTO: 0.05 X10(3) UL (ref 0–1)
IMM GRANULOCYTES # BLD AUTO: 0.06 X10(3) UL (ref 0–1)
IMM GRANULOCYTES # BLD AUTO: 0.07 X10(3) UL (ref 0–1)
IMM GRANULOCYTES # BLD AUTO: 0.09 X10(3) UL (ref 0–1)
IMM GRANULOCYTES # BLD AUTO: 0.09 X10(3) UL (ref 0–1)
IMM GRANULOCYTES NFR BLD: 0.2 %
IMM GRANULOCYTES NFR BLD: 0.4 %
IMM GRANULOCYTES NFR BLD: 0.5 %
IMM GRANULOCYTES NFR BLD: 0.6 %
IMM GRANULOCYTES NFR BLD: 0.8 %
IMM GRANULOCYTES NFR BLD: 0.8 %
IMM GRANULOCYTES NFR BLD: 0.9 %
IMM GRANULOCYTES NFR BLD: 1 %
IMM GRANULOCYTES NFR BLD: 1 %
IMM GRANULOCYTES NFR BLD: 1.2 %
IMMATURE GRANULOCYTE COUNT: 0.02 X10(3) UL (ref 0–1)
IMMATURE GRANULOCYTE COUNT: 0.03 X10(3) UL (ref 0–1)
IMMATURE GRANULOCYTE RATIO %: 1.4 %
IMMATURE GRANULOCYTE RATIO %: 1.8 %
INR BLD: 1.13 (ref 0.9–1.1)
INR BLD: 1.21 (ref 0.9–1.1)
INR BLD: 1.36 (ref 0.9–1.1)
INR BLD: 1.37 (ref 0.9–1.1)
INR BLD: 1.51 (ref 0.9–1.1)
INR BLD: 1.55 (ref 0.9–1.1)
INR BLD: 1.71 (ref 0.9–1.1)
INR BLD: 1.93 (ref 0.9–1.1)
INR BLD: 1.99 (ref 0.9–1.1)
KETONES UR STRIP.AUTO-MCNC: 20 MG/DL
KETONES UR STRIP.AUTO-MCNC: 80 MG/DL
LACTATE SERPL-SCNC: 1.1 MMOL/L (ref 0.4–2)
LACTIC ACID: 0.8 MMOL/L (ref 0.5–2)
LDH PLEURAL FLUID: 271 U/L
LEUKOCYTE ESTERASE UR QL STRIP.AUTO: NEGATIVE
LEUKOCYTE ESTERASE UR QL STRIP.AUTO: NEGATIVE
LIPASE SERPL-CCNC: 61 U/L (ref 73–393)
LIPASE: 324 U/L (ref 73–393)
LYMPHOCYTE PLEURAL FLUID: 61 %
LYMPHOCYTES # BLD AUTO: 0.66 X10(3) UL (ref 1–4)
LYMPHOCYTES # BLD AUTO: 0.67 X10(3) UL (ref 1–4)
LYMPHOCYTES # BLD AUTO: 0.69 X10(3) UL (ref 1–4)
LYMPHOCYTES # BLD AUTO: 0.74 X10(3) UL (ref 1–4)
LYMPHOCYTES # BLD AUTO: 0.8 X10(3) UL (ref 1–4)
LYMPHOCYTES # BLD AUTO: 0.8 X10(3) UL (ref 1–4)
LYMPHOCYTES # BLD AUTO: 0.87 X10(3) UL (ref 1–4)
LYMPHOCYTES # BLD AUTO: 0.9 X10(3) UL (ref 0.9–4)
LYMPHOCYTES # BLD AUTO: 0.97 X10(3) UL (ref 1–4)
LYMPHOCYTES # BLD AUTO: 0.99 X10(3) UL (ref 1–4)
LYMPHOCYTES # BLD AUTO: 1.08 X10(3) UL (ref 0.9–4)
LYMPHOCYTES # BLD AUTO: 1.16 X10(3) UL (ref 1–4)
LYMPHOCYTES NFR BLD AUTO: 10.7 %
LYMPHOCYTES NFR BLD AUTO: 13.3 %
LYMPHOCYTES NFR BLD AUTO: 13.8 %
LYMPHOCYTES NFR BLD AUTO: 16 %
LYMPHOCYTES NFR BLD AUTO: 16.1 %
LYMPHOCYTES NFR BLD AUTO: 20.1 %
LYMPHOCYTES NFR BLD AUTO: 23.2 %
LYMPHOCYTES NFR BLD AUTO: 23.5 %
LYMPHOCYTES NFR BLD AUTO: 6.3 %
LYMPHOCYTES NFR BLD AUTO: 64.3 %
LYMPHOCYTES NFR BLD AUTO: 64.3 %
LYMPHOCYTES NFR BLD AUTO: 8.1 %
M PROTEIN MFR SERPL ELPH: 4.5 G/DL (ref 6.4–8.2)
M PROTEIN MFR SERPL ELPH: 4.5 G/DL (ref 6.4–8.2)
M PROTEIN MFR SERPL ELPH: 4.9 G/DL (ref 6.4–8.2)
M PROTEIN MFR SERPL ELPH: 5.4 G/DL (ref 6.4–8.2)
M PROTEIN MFR SERPL ELPH: 5.9 G/DL (ref 6.4–8.2)
M PROTEIN MFR SERPL ELPH: 5.9 G/DL (ref 6.4–8.2)
MCH RBC QN AUTO: 24.7 PG (ref 27–33.2)
MCH RBC QN AUTO: 25.4 PG (ref 27–33.2)
MCH RBC QN AUTO: 26.8 PG (ref 26–34)
MCH RBC QN AUTO: 27.5 PG (ref 26–34)
MCH RBC QN AUTO: 27.5 PG (ref 26–34)
MCH RBC QN AUTO: 27.6 PG (ref 26–34)
MCH RBC QN AUTO: 27.8 PG (ref 26–34)
MCH RBC QN AUTO: 28 PG (ref 26–34)
MCH RBC QN AUTO: 28 PG (ref 26–34)
MCH RBC QN AUTO: 28.1 PG (ref 26–34)
MCH RBC QN AUTO: 28.1 PG (ref 26–34)
MCH RBC QN AUTO: 28.4 PG (ref 26–34)
MCH RBC QN AUTO: 28.4 PG (ref 26–34)
MCH RBC QN AUTO: 28.5 PG (ref 26–34)
MCHC RBC AUTO-ENTMCNC: 31.5 G/DL (ref 31–37)
MCHC RBC AUTO-ENTMCNC: 32.4 G/DL (ref 31–37)
MCHC RBC AUTO-ENTMCNC: 32.5 G/DL (ref 31–37)
MCHC RBC AUTO-ENTMCNC: 32.5 G/DL (ref 31–37)
MCHC RBC AUTO-ENTMCNC: 32.7 G/DL (ref 31–37)
MCHC RBC AUTO-ENTMCNC: 33 G/DL (ref 31–37)
MCHC RBC AUTO-ENTMCNC: 33 G/DL (ref 31–37)
MCHC RBC AUTO-ENTMCNC: 33.2 G/DL (ref 31–37)
MCHC RBC AUTO-ENTMCNC: 33.3 G/DL (ref 31–37)
MCHC RBC AUTO-ENTMCNC: 33.6 G/DL (ref 31–37)
MCHC RBC AUTO-ENTMCNC: 33.8 G/DL (ref 31–37)
MCHC RBC AUTO-ENTMCNC: 34 G/DL (ref 31–37)
MCHC RBC AUTO-ENTMCNC: 34.1 G/DL (ref 31–37)
MCHC RBC AUTO-ENTMCNC: 34.1 G/DL (ref 31–37)
MCV RBC AUTO: 75.3 FL (ref 81–100)
MCV RBC AUTO: 77 FL (ref 81–100)
MCV RBC AUTO: 82.2 FL (ref 80–100)
MCV RBC AUTO: 82.8 FL (ref 80–100)
MCV RBC AUTO: 82.9 FL (ref 80–100)
MCV RBC AUTO: 83.4 FL (ref 80–100)
MCV RBC AUTO: 83.8 FL (ref 80–100)
MCV RBC AUTO: 84.4 FL (ref 80–100)
MCV RBC AUTO: 84.6 FL (ref 80–100)
MCV RBC AUTO: 84.7 FL (ref 80–100)
MCV RBC AUTO: 84.8 FL (ref 80–100)
MCV RBC AUTO: 85.1 FL (ref 80–100)
MCV RBC AUTO: 85.2 FL (ref 80–100)
MCV RBC AUTO: 85.6 FL (ref 80–100)
MESOTHELIAL PLEURAL FLUID: 1 %
MONO/MAC/HISTIO PLEURAL FLUID: 26 %
MONOCYTES # BLD AUTO: 0.09 X10(3) UL (ref 0.1–1)
MONOCYTES # BLD AUTO: 0.1 X10(3) UL (ref 0.1–1)
MONOCYTES # BLD AUTO: 0.43 X10(3) UL (ref 0.1–1)
MONOCYTES # BLD AUTO: 0.5 X10(3) UL (ref 0.1–1)
MONOCYTES # BLD AUTO: 0.53 X10(3) UL (ref 0.1–1)
MONOCYTES # BLD AUTO: 0.57 X10(3) UL (ref 0.1–1)
MONOCYTES # BLD AUTO: 0.76 X10(3) UL (ref 0.1–1)
MONOCYTES # BLD AUTO: 0.82 X10(3) UL (ref 0.1–1)
MONOCYTES # BLD AUTO: 0.88 X10(3) UL (ref 0.1–1)
MONOCYTES # BLD AUTO: 0.98 X10(3) UL (ref 0.1–1)
MONOCYTES # BLD AUTO: 0.98 X10(3) UL (ref 0.1–1)
MONOCYTES # BLD AUTO: 1.08 X10(3) UL (ref 0.1–1)
MONOCYTES NFR BLD AUTO: 10.8 %
MONOCYTES NFR BLD AUTO: 11.9 %
MONOCYTES NFR BLD AUTO: 12.4 %
MONOCYTES NFR BLD AUTO: 16.2 %
MONOCYTES NFR BLD AUTO: 16.5 %
MONOCYTES NFR BLD AUTO: 19.6 %
MONOCYTES NFR BLD AUTO: 19.7 %
MONOCYTES NFR BLD AUTO: 5.4 %
MONOCYTES NFR BLD AUTO: 6.5 %
MONOCYTES NFR BLD AUTO: 7.1 %
MONOCYTES NFR BLD AUTO: 8.2 %
MONOCYTES NFR BLD AUTO: 8.6 %
NEUTROPHIL ABS PRELIM: 0.37 X10 (3) UL (ref 1.3–6.7)
NEUTROPHIL ABS PRELIM: 0.46 X10 (3) UL (ref 1.3–6.7)
NEUTROPHIL PLEURAL FLUID: 2 %
NEUTROPHILS # BLD AUTO: 0.37 X10(3) UL (ref 1.3–6.7)
NEUTROPHILS # BLD AUTO: 0.46 X10(3) UL (ref 1.3–6.7)
NEUTROPHILS # BLD AUTO: 10.18 X10 (3) UL (ref 1.5–7.7)
NEUTROPHILS # BLD AUTO: 10.18 X10(3) UL (ref 1.5–7.7)
NEUTROPHILS # BLD AUTO: 2 X10 (3) UL (ref 1.5–7.7)
NEUTROPHILS # BLD AUTO: 2 X10(3) UL (ref 1.5–7.7)
NEUTROPHILS # BLD AUTO: 2.01 X10 (3) UL (ref 1.5–7.7)
NEUTROPHILS # BLD AUTO: 2.01 X10(3) UL (ref 1.5–7.7)
NEUTROPHILS # BLD AUTO: 2.63 X10 (3) UL (ref 1.5–7.7)
NEUTROPHILS # BLD AUTO: 2.63 X10(3) UL (ref 1.5–7.7)
NEUTROPHILS # BLD AUTO: 2.69 X10 (3) UL (ref 1.5–7.7)
NEUTROPHILS # BLD AUTO: 2.69 X10(3) UL (ref 1.5–7.7)
NEUTROPHILS # BLD AUTO: 3.23 X10 (3) UL (ref 1.5–7.7)
NEUTROPHILS # BLD AUTO: 3.23 X10(3) UL (ref 1.5–7.7)
NEUTROPHILS # BLD AUTO: 3.74 X10 (3) UL (ref 1.5–7.7)
NEUTROPHILS # BLD AUTO: 3.74 X10(3) UL (ref 1.5–7.7)
NEUTROPHILS # BLD AUTO: 6.33 X10 (3) UL (ref 1.5–7.7)
NEUTROPHILS # BLD AUTO: 6.33 X10(3) UL (ref 1.5–7.7)
NEUTROPHILS # BLD AUTO: 7.02 X10 (3) UL (ref 1.5–7.7)
NEUTROPHILS # BLD AUTO: 7.02 X10(3) UL (ref 1.5–7.7)
NEUTROPHILS # BLD AUTO: 8.98 X10 (3) UL (ref 1.5–7.7)
NEUTROPHILS # BLD AUTO: 8.98 X10(3) UL (ref 1.5–7.7)
NEUTROPHILS NFR BLD AUTO: 26.5 %
NEUTROPHILS NFR BLD AUTO: 27.3 %
NEUTROPHILS NFR BLD AUTO: 57.9 %
NEUTROPHILS NFR BLD AUTO: 61.3 %
NEUTROPHILS NFR BLD AUTO: 63 %
NEUTROPHILS NFR BLD AUTO: 64 %
NEUTROPHILS NFR BLD AUTO: 64.4 %
NEUTROPHILS NFR BLD AUTO: 73 %
NEUTROPHILS NFR BLD AUTO: 74.6 %
NEUTROPHILS NFR BLD AUTO: 77.3 %
NEUTROPHILS NFR BLD AUTO: 83.1 %
NEUTROPHILS NFR BLD AUTO: 86.3 %
NITRITE UR QL STRIP.AUTO: NEGATIVE
NITRITE UR QL STRIP.AUTO: NEGATIVE
NT-PROBNP SERPL-MCNC: 260 PG/ML (ref ?–125)
OSMOLALITY SERPL CALC.SUM OF ELEC: 287 MOSM/KG (ref 275–295)
OSMOLALITY SERPL CALC.SUM OF ELEC: 291 MOSM/KG (ref 275–295)
OSMOLALITY SERPL CALC.SUM OF ELEC: 292 MOSM/KG (ref 275–295)
OSMOLALITY SERPL CALC.SUM OF ELEC: 293 MOSM/KG (ref 275–295)
OSMOLALITY SERPL CALC.SUM OF ELEC: 294 MOSM/KG (ref 275–295)
OSMOLALITY SERPL CALC.SUM OF ELEC: 294 MOSM/KG (ref 275–295)
OSMOLALITY SERPL CALC.SUM OF ELEC: 295 MOSM/KG (ref 275–295)
OSMOLALITY SERPL CALC.SUM OF ELEC: 296 MOSM/KG (ref 275–295)
OSMOLALITY SERPL CALC.SUM OF ELEC: 298 MOSM/KG (ref 275–295)
OSMOLALITY SERPL CALC.SUM OF ELEC: 299 MOSM/KG (ref 275–295)
OSMOLALITY SERPL CALC.SUM OF ELEC: 300 MOSM/KG (ref 275–295)
OSMOLALITY SERPL CALC.SUM OF ELEC: 302 MOSM/KG (ref 275–295)
OSMOLALITY SERPL CALC.SUM OF ELEC: 303 MOSM/KG (ref 275–295)
OSMOLALITY SERPL CALC.SUM OF ELEC: 303 MOSM/KG (ref 275–295)
OTHER CELL PLEURAL FLUID: 10 %
P AXIS: 42 DEGREES
P AXIS: 45 DEGREES
P AXIS: 50 DEGREES
P AXIS: 55 DEGREES
P AXIS: 58 DEGREES
P AXIS: 96 DEGREES
P-R INTERVAL: 112 MS
P-R INTERVAL: 112 MS
P-R INTERVAL: 118 MS
P-R INTERVAL: 120 MS
P-R INTERVAL: 122 MS
P-R INTERVAL: 130 MS
PH UR STRIP.AUTO: 5 [PH] (ref 4.5–8)
PH UR STRIP.AUTO: 6 [PH] (ref 4.5–8)
PHOSPHATE SERPL-MCNC: 2 MG/DL (ref 2.5–4.9)
PHOSPHATE SERPL-MCNC: 2.3 MG/DL (ref 2.5–4.9)
PHOSPHATE SERPL-MCNC: 2.3 MG/DL (ref 2.5–4.9)
PHOSPHATE SERPL-MCNC: 2.4 MG/DL (ref 2.5–4.9)
PHOSPHATE SERPL-MCNC: 2.6 MG/DL (ref 2.5–4.9)
PHOSPHATE SERPL-MCNC: 3.1 MG/DL (ref 2.5–4.9)
PLATELET # BLD AUTO: 110 10(3)UL (ref 150–450)
PLATELET # BLD AUTO: 146 10(3)UL (ref 150–450)
PLATELET # BLD AUTO: 307 10(3)UL (ref 150–450)
PLATELET # BLD AUTO: 310 10(3)UL (ref 150–450)
PLATELET # BLD AUTO: 357 10(3)UL (ref 150–450)
PLATELET # BLD AUTO: 358 10(3)UL (ref 150–450)
PLATELET # BLD AUTO: 366 10(3)UL (ref 150–450)
PLATELET # BLD AUTO: 374 10(3)UL (ref 150–450)
PLATELET # BLD AUTO: 386 10(3)UL (ref 150–450)
PLATELET # BLD AUTO: 402 10(3)UL (ref 150–450)
PLATELET # BLD AUTO: 429 10(3)UL (ref 150–450)
PLATELET # BLD AUTO: 448 10(3)UL (ref 150–450)
PLATELET # BLD AUTO: 448 10(3)UL (ref 150–450)
PLATELET # BLD AUTO: 450 10(3)UL (ref 150–450)
PLATELET # BLD AUTO: 453 10(3)UL (ref 150–450)
PLATELET # BLD AUTO: 461 10(3)UL (ref 150–450)
PLATELET # BLD AUTO: 474 10(3)UL (ref 150–450)
PLATELET # BLD AUTO: 509 10(3)UL (ref 150–450)
PLATELET MORPHOLOGY: NORMAL
PLEURAL FLUID PH: 7.49 (ref 7.2–7.5)
POTASSIUM SERPL-SCNC: 3.1 MMOL/L (ref 3.6–5.1)
POTASSIUM SERPL-SCNC: 3.1 MMOL/L (ref 3.6–5.1)
POTASSIUM SERPL-SCNC: 3.2 MMOL/L (ref 3.6–5.1)
POTASSIUM SERPL-SCNC: 3.2 MMOL/L (ref 3.6–5.1)
POTASSIUM SERPL-SCNC: 3.3 MMOL/L (ref 3.6–5.1)
POTASSIUM SERPL-SCNC: 3.5 MMOL/L (ref 3.6–5.1)
POTASSIUM SERPL-SCNC: 3.6 MMOL/L (ref 3.5–5.1)
POTASSIUM SERPL-SCNC: 3.6 MMOL/L (ref 3.6–5.1)
POTASSIUM SERPL-SCNC: 3.7 MMOL/L (ref 3.6–5.1)
POTASSIUM SERPL-SCNC: 3.7 MMOL/L (ref 3.6–5.1)
POTASSIUM SERPL-SCNC: 3.9 MMOL/L (ref 3.6–5.1)
POTASSIUM SERPL-SCNC: 4 MMOL/L (ref 3.5–5.1)
POTASSIUM SERPL-SCNC: 4 MMOL/L (ref 3.6–5.1)
POTASSIUM SERPL-SCNC: 4.1 MMOL/L (ref 3.5–5.1)
POTASSIUM SERPL-SCNC: 4.1 MMOL/L (ref 3.6–5.1)
POTASSIUM SERPL-SCNC: 4.2 MMOL/L (ref 3.5–5.1)
POTASSIUM SERPL-SCNC: 4.6 MMOL/L (ref 3.5–5.1)
PROCALCITONIN SERPL-MCNC: <0.11 NG/ML
PROT PLR-MCNC: 2 G/DL
PROT UR STRIP.AUTO-MCNC: 100 MG/DL
PROT UR STRIP.AUTO-MCNC: 30 MG/DL
PSA SERPL DL<=0.01 NG/ML-MCNC: 15 SECONDS (ref 12.4–14.7)
PSA SERPL DL<=0.01 NG/ML-MCNC: 15.8 SECONDS (ref 12.4–14.7)
PSA SERPL DL<=0.01 NG/ML-MCNC: 17.3 SECONDS (ref 12.4–14.7)
PSA SERPL DL<=0.01 NG/ML-MCNC: 17.4 SECONDS (ref 12.4–14.7)
PSA SERPL DL<=0.01 NG/ML-MCNC: 18.8 SECONDS (ref 12.4–14.7)
PSA SERPL DL<=0.01 NG/ML-MCNC: 19.2 SECONDS (ref 12.4–14.7)
PSA SERPL DL<=0.01 NG/ML-MCNC: 20.7 SECONDS (ref 12.4–14.7)
PSA SERPL DL<=0.01 NG/ML-MCNC: 22.7 SECONDS (ref 12.4–14.7)
PSA SERPL DL<=0.01 NG/ML-MCNC: 23.3 SECONDS (ref 12.4–14.7)
Q-T INTERVAL: 310 MS
Q-T INTERVAL: 322 MS
Q-T INTERVAL: 326 MS
Q-T INTERVAL: 328 MS
Q-T INTERVAL: 338 MS
Q-T INTERVAL: 348 MS
QRS DURATION: 60 MS
QRS DURATION: 60 MS
QRS DURATION: 64 MS
QRS DURATION: 64 MS
QRS DURATION: 72 MS
QRS DURATION: 72 MS
QTC CALCULATION (BEZET): 441 MS
QTC CALCULATION (BEZET): 447 MS
QTC CALCULATION (BEZET): 450 MS
QTC CALCULATION (BEZET): 451 MS
QTC CALCULATION (BEZET): 464 MS
QTC CALCULATION (BEZET): 464 MS
R AXIS: -14 DEGREES
R AXIS: -28 DEGREES
R AXIS: -3 DEGREES
R AXIS: -4 DEGREES
R AXIS: 4 DEGREES
R AXIS: 9 DEGREES
RBC # BLD AUTO: 2.43 X10(6)UL (ref 3.8–5.3)
RBC # BLD AUTO: 2.77 X10(6)UL (ref 3.8–5.3)
RBC # BLD AUTO: 2.77 X10(6)UL (ref 3.8–5.3)
RBC # BLD AUTO: 2.88 X10(6)UL (ref 3.8–5.1)
RBC # BLD AUTO: 2.91 X10(6)UL (ref 3.8–5.1)
RBC # BLD AUTO: 2.96 X10(6)UL (ref 3.8–5.3)
RBC # BLD AUTO: 2.98 X10(6)UL (ref 3.8–5.3)
RBC # BLD AUTO: 2.98 X10(6)UL (ref 3.8–5.3)
RBC # BLD AUTO: 3.02 X10(6)UL (ref 3.8–5.3)
RBC # BLD AUTO: 3.08 X10(6)UL (ref 3.8–5.3)
RBC # BLD AUTO: 3.2 X10(6)UL (ref 3.8–5.3)
RBC # BLD AUTO: 3.38 X10(6)UL (ref 3.8–5.3)
RBC # BLD AUTO: 3.46 X10(6)UL (ref 3.8–5.3)
RBC # BLD AUTO: 3.49 X10(6)UL (ref 3.8–5.3)
RBC #/AREA URNS AUTO: >10 /HPF
RBC PLEURAL FLUID: <3000 /MM3
RBC UR QL AUTO: NEGATIVE
RBC UR QL AUTO: NEGATIVE
RED CELL DISTRIBUTION WIDTH-SD: 51.1 FL (ref 35.1–46.3)
RED CELL DISTRIBUTION WIDTH-SD: 55.7 FL (ref 35.1–46.3)
RH BLOOD TYPE: POSITIVE
RH BLOOD TYPE: POSITIVE
SODIUM SERPL-SCNC: 140 MMOL/L (ref 136–144)
SODIUM SERPL-SCNC: 141 MMOL/L (ref 136–144)
SODIUM SERPL-SCNC: 141 MMOL/L (ref 136–145)
SODIUM SERPL-SCNC: 142 MMOL/L (ref 136–144)
SODIUM SERPL-SCNC: 142 MMOL/L (ref 136–145)
SODIUM SERPL-SCNC: 144 MMOL/L (ref 136–144)
SODIUM SERPL-SCNC: 144 MMOL/L (ref 136–145)
SODIUM SERPL-SCNC: 145 MMOL/L (ref 136–144)
SODIUM SERPL-SCNC: 146 MMOL/L (ref 136–144)
SODIUM SERPL-SCNC: 146 MMOL/L (ref 136–144)
SODIUM SERPL-SCNC: 146 MMOL/L (ref 136–145)
SODIUM SERPL-SCNC: 146 MMOL/L (ref 136–145)
SODIUM SERPL-SCNC: 147 MMOL/L (ref 136–144)
SODIUM SERPL-SCNC: 147 MMOL/L (ref 136–144)
SP GR UR STRIP.AUTO: 1.03 (ref 1–1.03)
SP GR UR STRIP.AUTO: 1.05 (ref 1–1.03)
T AXIS: -9 DEGREES
T AXIS: 15 DEGREES
T AXIS: 24 DEGREES
T AXIS: 3 DEGREES
T AXIS: 5 DEGREES
T AXIS: 59 DEGREES
TOTAL CELLS COUNTED: 100
TROPONIN I SERPL-MCNC: <0.045 NG/ML (ref ?–0.04)
TROPONIN I SERPL-MCNC: <0.046 NG/ML (ref ?–0.05)
TROPONIN I SERPL-MCNC: <0.046 NG/ML (ref ?–0.05)
UROBILINOGEN UR STRIP.AUTO-MCNC: 1 MG/DL
UROBILINOGEN UR STRIP.AUTO-MCNC: 4 MG/DL
VENTRICULAR RATE: 107 BPM
VENTRICULAR RATE: 107 BPM
VENTRICULAR RATE: 112 BPM
VENTRICULAR RATE: 115 BPM
VENTRICULAR RATE: 122 BPM
VENTRICULAR RATE: 125 BPM
WBC # BLD AUTO: 1.4 X10(3) UL (ref 4–13)
WBC # BLD AUTO: 1.7 X10(3) UL (ref 4–13)
WBC # BLD AUTO: 10.8 X10(3) UL (ref 4–11)
WBC # BLD AUTO: 11.8 X10(3) UL (ref 4–11)
WBC # BLD AUTO: 11.9 X10(3) UL (ref 4–11)
WBC # BLD AUTO: 3.3 X10(3) UL (ref 4–11)
WBC # BLD AUTO: 3.5 X10(3) UL (ref 4–11)
WBC # BLD AUTO: 4.2 X10(3) UL (ref 4–11)
WBC # BLD AUTO: 4.2 X10(3) UL (ref 4–11)
WBC # BLD AUTO: 5 X10(3) UL (ref 4–11)
WBC # BLD AUTO: 5 X10(3) UL (ref 4–11)
WBC # BLD AUTO: 8.7 X10(3) UL (ref 4–11)
WBC # BLD AUTO: 9.1 X10(3) UL (ref 4–11)
WBC # BLD AUTO: 9.8 X10(3) UL (ref 4–11)
WBC PLEURAL FLUID: 189 /MM3

## 2019-01-01 PROCEDURE — 30233H1 TRANSFUSION OF NONAUTOLOGOUS WHOLE BLOOD INTO PERIPHERAL VEIN, PERCUTANEOUS APPROACH: ICD-10-PCS | Performed by: INTERNAL MEDICINE

## 2019-01-01 PROCEDURE — 71045 X-RAY EXAM CHEST 1 VIEW: CPT | Performed by: INTERNAL MEDICINE

## 2019-01-01 PROCEDURE — 85610 PROTHROMBIN TIME: CPT | Performed by: EMERGENCY MEDICINE

## 2019-01-01 PROCEDURE — 71045 X-RAY EXAM CHEST 1 VIEW: CPT | Performed by: EMERGENCY MEDICINE

## 2019-01-01 PROCEDURE — 80048 BASIC METABOLIC PNL TOTAL CA: CPT | Performed by: HOSPITALIST

## 2019-01-01 PROCEDURE — 93010 ELECTROCARDIOGRAM REPORT: CPT

## 2019-01-01 PROCEDURE — 99233 SBSQ HOSP IP/OBS HIGH 50: CPT | Performed by: NURSE PRACTITIONER

## 2019-01-01 PROCEDURE — 99285 EMERGENCY DEPT VISIT HI MDM: CPT

## 2019-01-01 PROCEDURE — 80053 COMPREHEN METABOLIC PANEL: CPT | Performed by: EMERGENCY MEDICINE

## 2019-01-01 PROCEDURE — 32555 ASPIRATE PLEURA W/ IMAGING: CPT | Performed by: INTERNAL MEDICINE

## 2019-01-01 PROCEDURE — 85730 THROMBOPLASTIN TIME PARTIAL: CPT | Performed by: EMERGENCY MEDICINE

## 2019-01-01 PROCEDURE — 71045 X-RAY EXAM CHEST 1 VIEW: CPT | Performed by: RADIOLOGY

## 2019-01-01 PROCEDURE — 86850 RBC ANTIBODY SCREEN: CPT | Performed by: EMERGENCY MEDICINE

## 2019-01-01 PROCEDURE — 0WJB3ZZ INSPECTION OF LEFT PLEURAL CAVITY, PERCUTANEOUS APPROACH: ICD-10-PCS | Performed by: RADIOLOGY

## 2019-01-01 PROCEDURE — 99309 SBSQ NF CARE MODERATE MDM 30: CPT | Performed by: NURSE PRACTITIONER

## 2019-01-01 PROCEDURE — 81001 URINALYSIS AUTO W/SCOPE: CPT | Performed by: HOSPITALIST

## 2019-01-01 PROCEDURE — 83735 ASSAY OF MAGNESIUM: CPT | Performed by: HOSPITALIST

## 2019-01-01 PROCEDURE — 86901 BLOOD TYPING SEROLOGIC RH(D): CPT | Performed by: EMERGENCY MEDICINE

## 2019-01-01 PROCEDURE — 30233N1 TRANSFUSION OF NONAUTOLOGOUS RED BLOOD CELLS INTO PERIPHERAL VEIN, PERCUTANEOUS APPROACH: ICD-10-PCS | Performed by: INTERNAL MEDICINE

## 2019-01-01 PROCEDURE — 82272 OCCULT BLD FECES 1-3 TESTS: CPT

## 2019-01-01 PROCEDURE — 0BPQ30Z REMOVAL OF DRAINAGE DEVICE FROM PLEURA, PERCUTANEOUS APPROACH: ICD-10-PCS | Performed by: RADIOLOGY

## 2019-01-01 PROCEDURE — 99252 IP/OBS CONSLTJ NEW/EST SF 35: CPT | Performed by: NURSE PRACTITIONER

## 2019-01-01 PROCEDURE — 85378 FIBRIN DEGRADE SEMIQUANT: CPT | Performed by: HOSPITALIST

## 2019-01-01 PROCEDURE — 99356 PROLONGED SERV,INPATIENT,1ST HR: CPT | Performed by: NURSE PRACTITIONER

## 2019-01-01 PROCEDURE — 80053 COMPREHEN METABOLIC PANEL: CPT

## 2019-01-01 PROCEDURE — 32557 INSERT CATH PLEURA W/ IMAGE: CPT | Performed by: EMERGENCY MEDICINE

## 2019-01-01 PROCEDURE — 0W9B30Z DRAINAGE OF LEFT PLEURAL CAVITY WITH DRAINAGE DEVICE, PERCUTANEOUS APPROACH: ICD-10-PCS | Performed by: RADIOLOGY

## 2019-01-01 PROCEDURE — 76770 US EXAM ABDO BACK WALL COMP: CPT | Performed by: INTERNAL MEDICINE

## 2019-01-01 PROCEDURE — 93306 TTE W/DOPPLER COMPLETE: CPT | Performed by: HOSPITALIST

## 2019-01-01 PROCEDURE — 0W9B3ZX DRAINAGE OF LEFT PLEURAL CAVITY, PERCUTANEOUS APPROACH, DIAGNOSTIC: ICD-10-PCS | Performed by: INTERNAL MEDICINE

## 2019-01-01 PROCEDURE — 0W9G3ZZ DRAINAGE OF PERITONEAL CAVITY, PERCUTANEOUS APPROACH: ICD-10-PCS | Performed by: RADIOLOGY

## 2019-01-01 PROCEDURE — 49083 ABD PARACENTESIS W/IMAGING: CPT | Performed by: HOSPITALIST

## 2019-01-01 PROCEDURE — 93306 TTE W/DOPPLER COMPLETE: CPT | Performed by: INTERNAL MEDICINE

## 2019-01-01 PROCEDURE — 0W9G30Z DRAINAGE OF PERITONEAL CAVITY WITH DRAINAGE DEVICE, PERCUTANEOUS APPROACH: ICD-10-PCS | Performed by: RADIOLOGY

## 2019-01-01 PROCEDURE — 84145 PROCALCITONIN (PCT): CPT | Performed by: HOSPITALIST

## 2019-01-01 PROCEDURE — 86900 BLOOD TYPING SEROLOGIC ABO: CPT | Performed by: EMERGENCY MEDICINE

## 2019-01-01 PROCEDURE — 84132 ASSAY OF SERUM POTASSIUM: CPT | Performed by: HOSPITALIST

## 2019-01-01 PROCEDURE — 93970 EXTREMITY STUDY: CPT | Performed by: HOSPITALIST

## 2019-01-01 PROCEDURE — 93971 EXTREMITY STUDY: CPT | Performed by: INTERNAL MEDICINE

## 2019-01-01 PROCEDURE — 85025 COMPLETE CBC W/AUTO DIFF WBC: CPT | Performed by: EMERGENCY MEDICINE

## 2019-01-01 PROCEDURE — 36430 TRANSFUSION BLD/BLD COMPNT: CPT

## 2019-01-01 PROCEDURE — 99222 1ST HOSP IP/OBS MODERATE 55: CPT | Performed by: NURSE PRACTITIONER

## 2019-01-01 PROCEDURE — 83690 ASSAY OF LIPASE: CPT | Performed by: EMERGENCY MEDICINE

## 2019-01-01 PROCEDURE — 93005 ELECTROCARDIOGRAM TRACING: CPT

## 2019-01-01 PROCEDURE — 85025 COMPLETE CBC W/AUTO DIFF WBC: CPT

## 2019-01-01 PROCEDURE — 86920 COMPATIBILITY TEST SPIN: CPT

## 2019-01-01 PROCEDURE — 92610 EVALUATE SWALLOWING FUNCTION: CPT

## 2019-01-01 PROCEDURE — 99307 SBSQ NF CARE SF MDM 10: CPT | Performed by: NURSE PRACTITIONER

## 2019-01-01 PROCEDURE — 71275 CT ANGIOGRAPHY CHEST: CPT | Performed by: HOSPITALIST

## 2019-01-01 PROCEDURE — 83605 ASSAY OF LACTIC ACID: CPT | Performed by: EMERGENCY MEDICINE

## 2019-01-01 PROCEDURE — 85025 COMPLETE CBC W/AUTO DIFF WBC: CPT | Performed by: HOSPITALIST

## 2019-01-01 PROCEDURE — 36415 COLL VENOUS BLD VENIPUNCTURE: CPT

## 2019-01-01 RX ORDER — SODIUM PHOSPHATE, DIBASIC AND SODIUM PHOSPHATE, MONOBASIC 7; 19 G/133ML; G/133ML
1 ENEMA RECTAL ONCE AS NEEDED
Status: DISCONTINUED | OUTPATIENT
Start: 2019-01-01 | End: 2019-01-01

## 2019-01-01 RX ORDER — MORPHINE SULFATE 4 MG/ML
1 INJECTION, SOLUTION INTRAMUSCULAR; INTRAVENOUS
Status: DISCONTINUED | OUTPATIENT
Start: 2019-01-01 | End: 2019-01-01

## 2019-01-01 RX ORDER — POLYETHYLENE GLYCOL 3350 17 G/17G
17 POWDER, FOR SOLUTION ORAL DAILY
COMMUNITY
End: 2019-01-01 | Stop reason: CLARIF

## 2019-01-01 RX ORDER — SODIUM CHLORIDE 9 MG/ML
INJECTION, SOLUTION INTRAVENOUS CONTINUOUS
Status: DISCONTINUED | OUTPATIENT
Start: 2019-01-01 | End: 2019-01-01

## 2019-01-01 RX ORDER — ONDANSETRON 4 MG/1
4 TABLET, FILM COATED ORAL EVERY 8 HOURS PRN
Status: DISCONTINUED | OUTPATIENT
Start: 2019-01-01 | End: 2019-01-01

## 2019-01-01 RX ORDER — LORAZEPAM 2 MG/ML
1 INJECTION INTRAMUSCULAR EVERY 4 HOURS PRN
Status: DISCONTINUED | OUTPATIENT
Start: 2019-01-01 | End: 2019-01-01

## 2019-01-01 RX ORDER — SODIUM CHLORIDE 450 MG/100ML
INJECTION, SOLUTION INTRAVENOUS CONTINUOUS
Status: DISCONTINUED | OUTPATIENT
Start: 2019-01-01 | End: 2019-01-01

## 2019-01-01 RX ORDER — MIDAZOLAM HYDROCHLORIDE 1 MG/ML
INJECTION INTRAMUSCULAR; INTRAVENOUS
Status: DISCONTINUED
Start: 2019-01-01 | End: 2019-01-01 | Stop reason: WASHOUT

## 2019-01-01 RX ORDER — ACETAMINOPHEN 650 MG/1
650 SUPPOSITORY RECTAL EVERY 6 HOURS SCHEDULED
Status: DISCONTINUED | OUTPATIENT
Start: 2019-01-01 | End: 2019-01-01

## 2019-01-01 RX ORDER — LIDOCAINE HYDROCHLORIDE 10 MG/ML
INJECTION, SOLUTION INFILTRATION; PERINEURAL
Status: COMPLETED
Start: 2019-01-01 | End: 2019-01-01

## 2019-01-01 RX ORDER — MORPHINE SULFATE 4 MG/ML
2 INJECTION, SOLUTION INTRAMUSCULAR; INTRAVENOUS
Status: DISCONTINUED | OUTPATIENT
Start: 2019-01-01 | End: 2019-01-01

## 2019-01-01 RX ORDER — POTASSIUM CHLORIDE 29.8 MG/ML
40 INJECTION INTRAVENOUS ONCE
Status: COMPLETED | OUTPATIENT
Start: 2019-01-01 | End: 2019-01-01

## 2019-01-01 RX ORDER — ONDANSETRON 2 MG/ML
4 INJECTION INTRAMUSCULAR; INTRAVENOUS EVERY 6 HOURS PRN
Status: DISCONTINUED | OUTPATIENT
Start: 2019-01-01 | End: 2019-01-01

## 2019-01-01 RX ORDER — HYDROCODONE BITARTRATE AND ACETAMINOPHEN 5; 325 MG/1; MG/1
2 TABLET ORAL EVERY 6 HOURS PRN
Status: DISCONTINUED | OUTPATIENT
Start: 2019-01-01 | End: 2019-01-01

## 2019-01-01 RX ORDER — POLYETHYLENE GLYCOL 3350 17 G/17G
17 POWDER, FOR SOLUTION ORAL DAILY PRN
Status: DISCONTINUED | OUTPATIENT
Start: 2019-01-01 | End: 2019-01-01

## 2019-01-01 RX ORDER — SCOLOPAMINE TRANSDERMAL SYSTEM 1 MG/1
1 PATCH, EXTENDED RELEASE TRANSDERMAL
Status: DISCONTINUED | OUTPATIENT
Start: 2019-01-01 | End: 2019-01-01

## 2019-01-01 RX ORDER — HEPARIN SODIUM AND DEXTROSE 10000; 5 [USP'U]/100ML; G/100ML
INJECTION INTRAVENOUS CONTINUOUS
Status: DISCONTINUED | OUTPATIENT
Start: 2019-01-01 | End: 2019-01-01

## 2019-01-01 RX ORDER — ACETAMINOPHEN 160 MG/5ML
650 SOLUTION ORAL EVERY 4 HOURS PRN
Status: DISCONTINUED | OUTPATIENT
Start: 2019-01-01 | End: 2019-01-01

## 2019-01-01 RX ORDER — SODIUM CHLORIDE 9 MG/ML
INJECTION, SOLUTION INTRAVENOUS ONCE
Status: COMPLETED | OUTPATIENT
Start: 2019-01-01 | End: 2019-01-01

## 2019-01-01 RX ORDER — LORAZEPAM 2 MG/ML
2 INJECTION INTRAMUSCULAR EVERY 4 HOURS PRN
Status: DISCONTINUED | OUTPATIENT
Start: 2019-01-01 | End: 2019-01-01

## 2019-01-01 RX ORDER — HYDROMORPHONE HYDROCHLORIDE 1 MG/ML
0.5 INJECTION, SOLUTION INTRAMUSCULAR; INTRAVENOUS; SUBCUTANEOUS EVERY 30 MIN PRN
Status: ACTIVE | OUTPATIENT
Start: 2019-01-01 | End: 2019-01-01

## 2019-01-01 RX ORDER — HEPARIN SODIUM 5000 [USP'U]/ML
80 INJECTION INTRAVENOUS; SUBCUTANEOUS ONCE
Status: COMPLETED | OUTPATIENT
Start: 2019-01-01 | End: 2019-01-01

## 2019-01-01 RX ORDER — MIDAZOLAM HYDROCHLORIDE 1 MG/ML
INJECTION INTRAMUSCULAR; INTRAVENOUS
Status: COMPLETED
Start: 2019-01-01 | End: 2019-01-01

## 2019-01-01 RX ORDER — ALBUMIN (HUMAN) 12.5 G/50ML
25 SOLUTION INTRAVENOUS ONCE
Status: COMPLETED | OUTPATIENT
Start: 2019-01-01 | End: 2019-01-01

## 2019-01-01 RX ORDER — DOCUSATE SODIUM 100 MG/1
100 CAPSULE, LIQUID FILLED ORAL 2 TIMES DAILY
Status: DISCONTINUED | OUTPATIENT
Start: 2019-01-01 | End: 2019-01-01

## 2019-01-01 RX ORDER — ALBUMIN, HUMAN INJ 5% 5 %
250 SOLUTION INTRAVENOUS EVERY 12 HOURS
Status: COMPLETED | OUTPATIENT
Start: 2019-01-01 | End: 2019-01-01

## 2019-01-01 RX ORDER — HALOPERIDOL 5 MG/ML
1 INJECTION INTRAMUSCULAR
Status: DISCONTINUED | OUTPATIENT
Start: 2019-01-01 | End: 2019-01-01

## 2019-01-01 RX ORDER — HYDROCODONE BITARTRATE AND ACETAMINOPHEN 5; 325 MG/1; MG/1
1 TABLET ORAL EVERY 6 HOURS PRN
Status: ON HOLD | COMMUNITY
End: 2019-01-01

## 2019-01-01 RX ORDER — POTASSIUM CHLORIDE 20 MEQ/1
20 TABLET, EXTENDED RELEASE ORAL 2 TIMES DAILY
Status: DISCONTINUED | OUTPATIENT
Start: 2019-01-01 | End: 2019-01-01

## 2019-01-01 RX ORDER — HYDROCODONE BITARTRATE AND ACETAMINOPHEN 5; 325 MG/1; MG/1
1 TABLET ORAL EVERY 4 HOURS PRN
Status: DISCONTINUED | OUTPATIENT
Start: 2019-01-01 | End: 2019-01-01

## 2019-01-01 RX ORDER — BISACODYL 10 MG
10 SUPPOSITORY, RECTAL RECTAL
Status: DISCONTINUED | OUTPATIENT
Start: 2019-01-01 | End: 2019-01-01

## 2019-01-01 RX ORDER — METOCLOPRAMIDE HYDROCHLORIDE 5 MG/ML
10 INJECTION INTRAMUSCULAR; INTRAVENOUS EVERY 8 HOURS PRN
Status: DISCONTINUED | OUTPATIENT
Start: 2019-01-01 | End: 2019-01-01

## 2019-01-01 RX ORDER — PANTOPRAZOLE SODIUM 40 MG/1
40 TABLET, DELAYED RELEASE ORAL
Status: DISCONTINUED | OUTPATIENT
Start: 2019-01-01 | End: 2019-01-01

## 2019-01-01 RX ORDER — ACETAMINOPHEN 160 MG/5ML
650 SOLUTION ORAL EVERY 6 HOURS SCHEDULED
Status: DISCONTINUED | OUTPATIENT
Start: 2019-01-01 | End: 2019-01-01

## 2019-01-01 RX ORDER — POTASSIUM CHLORIDE 20 MEQ/1
20 TABLET, EXTENDED RELEASE ORAL ONCE
Status: COMPLETED | OUTPATIENT
Start: 2019-01-01 | End: 2019-01-01

## 2019-01-01 RX ORDER — SENNOSIDES 8.6 MG
8.6 TABLET ORAL 2 TIMES DAILY
Status: DISCONTINUED | OUTPATIENT
Start: 2019-01-01 | End: 2019-01-01

## 2019-01-01 RX ORDER — ACETAMINOPHEN 325 MG/1
650 TABLET ORAL EVERY 6 HOURS PRN
Status: ON HOLD | COMMUNITY
End: 2019-01-01

## 2019-01-01 RX ORDER — ONDANSETRON 2 MG/ML
4 INJECTION INTRAMUSCULAR; INTRAVENOUS EVERY 6 HOURS
Status: DISCONTINUED | OUTPATIENT
Start: 2019-01-01 | End: 2019-01-01

## 2019-01-01 RX ORDER — ONDANSETRON 4 MG/1
4 TABLET, FILM COATED ORAL EVERY 8 HOURS PRN
Status: ON HOLD | COMMUNITY
End: 2019-01-01

## 2019-01-01 RX ORDER — SENNOSIDES 8.6 MG
8.6 TABLET ORAL DAILY PRN
Status: DISCONTINUED | OUTPATIENT
Start: 2019-01-01 | End: 2019-01-01

## 2019-01-01 RX ORDER — SODIUM CHLORIDE 9 MG/ML
INJECTION, SOLUTION INTRAVENOUS CONTINUOUS
Status: ACTIVE | OUTPATIENT
Start: 2019-01-01 | End: 2019-01-01

## 2019-01-01 RX ORDER — POLYETHYLENE GLYCOL 3350 17 G/17G
17 POWDER, FOR SOLUTION ORAL DAILY
Status: DISCONTINUED | OUTPATIENT
Start: 2019-01-01 | End: 2019-01-01

## 2019-01-01 RX ORDER — ALBUMIN, HUMAN INJ 5% 5 %
500 SOLUTION INTRAVENOUS EVERY 12 HOURS
Status: COMPLETED | OUTPATIENT
Start: 2019-01-01 | End: 2019-01-01

## 2019-01-01 RX ORDER — CEFAZOLIN SODIUM/WATER 2 G/20 ML
SYRINGE (ML) INTRAVENOUS
Status: COMPLETED
Start: 2019-01-01 | End: 2019-01-01

## 2019-01-01 RX ORDER — HYDROCODONE BITARTRATE AND ACETAMINOPHEN 5; 325 MG/1; MG/1
1 TABLET ORAL EVERY 6 HOURS PRN
Qty: 30 TABLET | Refills: 0 | Status: ON HOLD | OUTPATIENT
Start: 2019-01-01 | End: 2019-01-01

## 2019-01-01 RX ORDER — MAGNESIUM OXIDE 400 MG (241.3 MG MAGNESIUM) TABLET
400 TABLET ONCE
Status: COMPLETED | OUTPATIENT
Start: 2019-01-01 | End: 2019-01-01

## 2019-01-01 RX ORDER — MAGNESIUM SULFATE HEPTAHYDRATE 40 MG/ML
2 INJECTION, SOLUTION INTRAVENOUS ONCE
Status: COMPLETED | OUTPATIENT
Start: 2019-01-01 | End: 2019-01-01

## 2019-01-01 RX ORDER — PROCHLORPERAZINE MALEATE 10 MG
10 TABLET ORAL EVERY 6 HOURS PRN
Status: DISCONTINUED | OUTPATIENT
Start: 2019-01-01 | End: 2019-01-01

## 2019-01-01 RX ORDER — ONDANSETRON 2 MG/ML
4 INJECTION INTRAMUSCULAR; INTRAVENOUS EVERY 4 HOURS PRN
Status: DISCONTINUED | OUTPATIENT
Start: 2019-01-01 | End: 2019-01-01

## 2019-01-01 RX ORDER — MULTIVITAMIN
1 TABLET ORAL DAILY
Status: ON HOLD | COMMUNITY
End: 2019-01-01

## 2019-01-01 RX ORDER — ATROPINE SULFATE 10 MG/ML
2 SOLUTION/ DROPS OPHTHALMIC EVERY 2 HOUR PRN
Status: DISCONTINUED | OUTPATIENT
Start: 2019-01-01 | End: 2019-01-01

## 2019-01-01 RX ORDER — BACITRACIN 500 [USP'U]/G
OINTMENT TOPICAL AS NEEDED
Status: CANCELLED | OUTPATIENT
Start: 2019-01-01

## 2019-01-01 RX ORDER — HYDROCODONE BITARTRATE AND ACETAMINOPHEN 5; 325 MG/1; MG/1
1-2 TABLET ORAL EVERY 6 HOURS PRN
Status: DISCONTINUED | OUTPATIENT
Start: 2019-01-01 | End: 2019-01-01

## 2019-01-01 RX ORDER — MAGNESIUM HYDROXIDE/ALUMINUM HYDROXICE/SIMETHICONE 120; 1200; 1200 MG/30ML; MG/30ML; MG/30ML
30 SUSPENSION ORAL 4 TIMES DAILY PRN
Status: DISCONTINUED | OUTPATIENT
Start: 2019-01-01 | End: 2019-01-01

## 2019-01-01 RX ORDER — SENNOSIDES 8.6 MG
8.6 TABLET ORAL 2 TIMES DAILY
COMMUNITY
End: 2019-01-01 | Stop reason: CLARIF

## 2019-01-01 RX ORDER — HEPARIN SODIUM AND DEXTROSE 10000; 5 [USP'U]/100ML; G/100ML
18 INJECTION INTRAVENOUS ONCE
Status: COMPLETED | OUTPATIENT
Start: 2019-01-01 | End: 2019-01-01

## 2019-01-01 RX ORDER — ONDANSETRON 4 MG/1
4 TABLET, ORALLY DISINTEGRATING ORAL EVERY 6 HOURS PRN
Status: DISCONTINUED | OUTPATIENT
Start: 2019-01-01 | End: 2019-01-01

## 2019-01-01 RX ORDER — BACITRACIN 500 [USP'U]/G
OINTMENT TOPICAL AS NEEDED
Status: DISCONTINUED | OUTPATIENT
Start: 2019-01-01 | End: 2019-01-01

## 2019-01-01 RX ORDER — GLYCOPYRROLATE 0.2 MG/ML
0.4 INJECTION, SOLUTION INTRAMUSCULAR; INTRAVENOUS
Status: DISCONTINUED | OUTPATIENT
Start: 2019-01-01 | End: 2019-01-01

## 2019-01-01 RX ORDER — ONDANSETRON 2 MG/ML
4 INJECTION INTRAMUSCULAR; INTRAVENOUS EVERY 4 HOURS PRN
Status: DISCONTINUED | OUTPATIENT
Start: 2019-01-01 | End: 2019-01-01 | Stop reason: HOSPADM

## 2019-01-01 RX ORDER — ACETAMINOPHEN 325 MG/1
650 TABLET ORAL EVERY 6 HOURS PRN
Status: DISCONTINUED | OUTPATIENT
Start: 2019-01-01 | End: 2019-01-01

## 2019-01-01 RX ORDER — POTASSIUM CHLORIDE 1.5 G/1.77G
40 POWDER, FOR SOLUTION ORAL 2 TIMES DAILY
Status: DISCONTINUED | OUTPATIENT
Start: 2019-01-01 | End: 2019-01-01

## 2019-01-01 RX ORDER — MAGNESIUM OXIDE 400 MG (241.3 MG MAGNESIUM) TABLET
800 TABLET ONCE
Status: COMPLETED | OUTPATIENT
Start: 2019-01-01 | End: 2019-01-01

## 2019-01-01 RX ORDER — POTASSIUM CHLORIDE 1.5 G/1.77G
20 POWDER, FOR SOLUTION ORAL ONCE
Status: COMPLETED | OUTPATIENT
Start: 2019-01-01 | End: 2019-01-01

## 2019-01-01 RX ORDER — ENOXAPARIN SODIUM 100 MG/ML
1 INJECTION SUBCUTANEOUS EVERY 12 HOURS SCHEDULED
Status: DISCONTINUED | OUTPATIENT
Start: 2019-01-01 | End: 2019-01-01

## 2019-01-01 RX ORDER — HYDROMORPHONE HYDROCHLORIDE 1 MG/ML
0.5 INJECTION, SOLUTION INTRAMUSCULAR; INTRAVENOUS; SUBCUTANEOUS EVERY 30 MIN PRN
Status: DISCONTINUED | OUTPATIENT
Start: 2019-01-01 | End: 2019-01-01 | Stop reason: HOSPADM

## 2019-01-01 RX ORDER — MIDODRINE HYDROCHLORIDE 10 MG/1
10 TABLET ORAL 3 TIMES DAILY
Status: DISCONTINUED | OUTPATIENT
Start: 2019-01-01 | End: 2019-01-01

## 2019-01-01 RX ORDER — LORAZEPAM 2 MG/ML
0.5 INJECTION INTRAMUSCULAR EVERY 4 HOURS PRN
Status: DISCONTINUED | OUTPATIENT
Start: 2019-01-01 | End: 2019-01-01

## 2019-01-01 RX ORDER — HYDROCODONE BITARTRATE AND ACETAMINOPHEN 5; 325 MG/1; MG/1
1 TABLET ORAL EVERY 6 HOURS PRN
Status: DISCONTINUED | OUTPATIENT
Start: 2019-01-01 | End: 2019-01-01

## 2019-01-01 RX ORDER — HALOPERIDOL 5 MG/ML
2 INJECTION INTRAMUSCULAR
Status: DISCONTINUED | OUTPATIENT
Start: 2019-01-01 | End: 2019-01-01

## 2019-01-01 RX ORDER — SENNOSIDES 8.6 MG
8.6 TABLET ORAL DAILY PRN
Status: ON HOLD | COMMUNITY
End: 2019-01-01

## 2019-01-01 RX ORDER — DEXTROSE AND SODIUM CHLORIDE 5; .45 G/100ML; G/100ML
INJECTION, SOLUTION INTRAVENOUS CONTINUOUS
Status: ACTIVE | OUTPATIENT
Start: 2019-01-01 | End: 2019-01-01

## 2019-01-01 RX ORDER — MIDODRINE HYDROCHLORIDE 5 MG/1
5 TABLET ORAL 3 TIMES DAILY
Status: DISCONTINUED | OUTPATIENT
Start: 2019-01-01 | End: 2019-01-01

## 2019-01-01 RX ORDER — ALPRAZOLAM 0.25 MG/1
0.25 TABLET ORAL ONCE
Status: DISCONTINUED | OUTPATIENT
Start: 2019-01-01 | End: 2019-01-01

## 2019-01-01 RX ORDER — ONDANSETRON 2 MG/ML
INJECTION INTRAMUSCULAR; INTRAVENOUS
Status: DISPENSED
Start: 2019-01-01 | End: 2019-01-01

## 2019-01-01 RX ORDER — HYDROMORPHONE HYDROCHLORIDE 1 MG/ML
INJECTION, SOLUTION INTRAMUSCULAR; INTRAVENOUS; SUBCUTANEOUS
Status: DISPENSED
Start: 2019-01-01 | End: 2019-01-01

## 2019-01-01 RX ORDER — POTASSIUM CHLORIDE 20 MEQ/1
40 TABLET, EXTENDED RELEASE ORAL ONCE
Status: COMPLETED | OUTPATIENT
Start: 2019-01-01 | End: 2019-01-01

## 2019-01-01 RX ORDER — HEPARIN SODIUM 5000 [USP'U]/ML
80 INJECTION INTRAVENOUS; SUBCUTANEOUS ONCE
Status: DISCONTINUED | OUTPATIENT
Start: 2019-01-01 | End: 2019-01-01

## 2019-01-01 RX ORDER — POLYETHYLENE GLYCOL 3350 17 G/17G
17 POWDER, FOR SOLUTION ORAL DAILY
Status: ON HOLD | COMMUNITY
End: 2019-01-01

## 2019-01-01 RX ORDER — SULFAMETHOXAZOLE AND TRIMETHOPRIM 800; 160 MG/1; MG/1
1 TABLET ORAL 2 TIMES DAILY
Qty: 14 TABLET | Refills: 0 | Status: SHIPPED | OUTPATIENT
Start: 2019-01-01 | End: 2019-01-01

## 2019-01-01 RX ORDER — ALBUMIN, HUMAN INJ 5% 5 %
250 SOLUTION INTRAVENOUS ONCE
Status: COMPLETED | OUTPATIENT
Start: 2019-01-01 | End: 2019-01-01

## 2019-01-01 RX ORDER — POTASSIUM CHLORIDE 20 MEQ/1
20 TABLET, EXTENDED RELEASE ORAL 2 TIMES DAILY
COMMUNITY
End: 2019-01-01 | Stop reason: CLARIF

## 2019-01-01 RX ORDER — ACETAMINOPHEN 650 MG/1
650 SUPPOSITORY RECTAL EVERY 4 HOURS PRN
Status: DISCONTINUED | OUTPATIENT
Start: 2019-01-01 | End: 2019-01-01

## 2019-01-01 RX ORDER — CEFAZOLIN SODIUM 1 G/3ML
INJECTION, POWDER, FOR SOLUTION INTRAMUSCULAR; INTRAVENOUS
Status: COMPLETED
Start: 2019-01-01 | End: 2019-01-01

## 2019-01-03 PROBLEM — C55 MALIGNANT NEOPLASM OF UTERUS, UNSPECIFIED SITE (HCC): Status: ACTIVE | Noted: 2019-01-01

## 2019-01-03 PROBLEM — D69.6 THROMBOCYTOPENIA (HCC): Status: ACTIVE | Noted: 2019-01-01

## 2019-01-03 PROBLEM — R00.2 PALPITATIONS: Status: ACTIVE | Noted: 2019-01-01

## 2019-01-03 PROBLEM — E87.6 HYPOKALEMIA: Status: ACTIVE | Noted: 2019-01-01

## 2019-01-03 PROBLEM — D64.9 ANEMIA: Status: ACTIVE | Noted: 2019-01-01

## 2019-01-03 PROBLEM — D61.818 PANCYTOPENIA (HCC): Status: ACTIVE | Noted: 2019-01-01

## 2019-01-03 NOTE — ED PROVIDER NOTES
Patient Seen in: [facility] Emergency Department    History   Patient presents with:  Abdomen/Flank Pain (GI/)    Stated Complaint: upper abdominal pain with inspiration, currently undergoing chemotherapy    HPI    Patient is a 68-year-old female Smoking status: Former Smoker      Smokeless tobacco: Never Used      Tobacco comment: quit over 25 years ago.  3 cigarettes a day    Alcohol use: No      Alcohol/week: 0.0 oz    Drug use: No      Review of Systems    Positive for stated complaint: upper ab PT 15.0 (*)     INR 1.13 (*)     All other components within normal limits   PTT, ACTIVATED - Abnormal; Notable for the following components:    PTT 48.9 (*)     All other components within normal limits   RBC MORPHOLOGY SCAN - Abnormal; Notable for the fo ABORH (BLOOD TYPE)   ANTIBODY SCREEN   PREPARE RBC   RAINBOW DRAW BLUE   RAINBOW DRAW LAVENDER   RAINBOW DRAW LIGHT GREEN   RAINBOW DRAW GOLD     EKG    Rate, intervals and axes as noted on EKG Report.   Rate: 107  Rhythm: Sinus Rhythm  Reading: Sinus tac

## 2019-01-03 NOTE — ED NOTES
Patient unable to void at this time. Given oral fluids and encouraged to call when she is able to void.

## 2019-01-03 NOTE — ED NOTES
While RN was in room patient's heart rate suddenly slowed from 150s to 106. MD notified of improvement. Will hold off on repeat EKG at this time.

## 2019-01-03 NOTE — ED INITIAL ASSESSMENT (HPI)
Patient presents with upper abdominal pain with inspiration. She states she also had labored breathing and tachycardia with the onset of pain. She is currently undergoing chemotherapy for ovarian cancer. Next chemo due tomorrow.

## 2019-01-03 NOTE — H&P
[facility] Hospitalist H&P       CC: Patient presents with:  Abdomen/Flank Pain (GI/)       PCP: Susy Han MD    History of Present Illness: Patient is a 58year old female with PMH sig for metastatic uterine cancer undergoing weekly chemo at [facility], with rec Oral Tab Take 1 tablet by mouth daily. Disp:  Rfl:          Soc Hx  Social History    Tobacco Use      Smoking status: Former Smoker      Smokeless tobacco: Never Used      Tobacco comment: quit over 25 years ago. 3 cigarettes a day    Alcohol use:  No CREATSERUM  0.37*   GLU  86   CA  8.5       Recent Labs   Lab  01/03/19   1335   ALT  21   AST  21   ALB  1.5*       No results for input(s): TROP in the last 168 hours.     Additional Diagnostics: ECG: ST    CXR: image personally reviewed L effusion/?inf Generalized weakness   -due to cancer, chemo, anemia  -supportive care with IVFs and will consult PT/OT  -CXR with concern for loculated effusion/process.   PCT is negative helping rule out PNA  -check UA/Ucx    Sinus tachycardia, hypotension  -no s/o s

## 2019-01-03 NOTE — ED NOTES
OK per MD to start transfusion inpatient. Patient signed consent for blood transfusion. Hospitalist at bedside.

## 2019-01-04 NOTE — CONSULTS
Pulmonary H&P/Consult       NAME: Cassie Henson - ROOM: [address] [place] [address] - MRN: [id number] - Age: 58year old - :  1955    Date of Admission: 1/3/2019  1:10 PM  Admission Diagnosis: Palpitations [R00.2]  Pancytopenia ([facility] [address] [D61.818]  Malignant neoplasm as needed for Pain. Disp:  Rfl:  Past Week at prn   Ondansetron HCl (ZOFRAN) 4 mg tablet Take 4 mg by mouth every 8 (eight) hours as needed for Nausea.  Disp:  Rfl:  Past Week at prn   Potassium Chloride ER 20 MEQ Oral Tab CR Take 20 mEq by mouth 2 (two) ti on file    Social History Narrative      Not on file         Family History:  Family History   Problem Relation Age of Onset   • Heart Disorder Mother    • Arthritis Mother    • Heart Disease Maternal Grandmother         PVD        Home Medications:     Out dysuria or changes in stream   MUSCULOSKELETAL:  denies back pain   NEURO:  denies headaches, no strokes or seizure history   PSYCHE:  denies depression or anxiety   HEMATOLOGIC:  denies hx of anemia   ENDOCRINE:  denies thyroid history      OBJECTIVE:   0 Trace to 1+ edema in meli LE   Pulses:   2+ and symmetric all extremities   Skin:   Skin color, texture, turgor normal, no rashes or lesions   Neurologic:   CNII-XII intact.  Normal strength, sensation and reflexes       throughout         Recent Labs      0 lateral aspect of RML  -would recommend repeat CT in 6-8 weeks to document resolution  4.  Dispo  -will follow as needed please call with questions                   Amanda Yang  [facility] Pulmonary and Critical Care

## 2019-01-04 NOTE — SLP NOTE
ADULT SWALLOWING EVALUATION    ASSESSMENT    ASSESSMENT/OVERALL IMPRESSION:  Orders received for clinical swallow evaluation due to pt c/o difficulty swallowing. Medical history obtained from EMR and pt report.  Briefly, patient is a 58year old female with sips  Medication Administration Recommendations: One pill at a time  Treatment Plan/Recommendations: GI consult; No further inpatient SLP service warranted  Discharge Recommendations/Plan: [address] [place] [address]  Reason for Referral: RN dysphagia Upright;Midline    Oral Phase of Swallow: Within Functional Limits   Pharyngeal Phase of Swallow: Within Functional Limits   (Please note: Silent aspiration cannot be evaluated clinically.  Videofluoroscopic Swallow Study is required to rule-out silent aspi

## 2019-01-04 NOTE — PHYSICAL THERAPY NOTE
PT attempted to see the pt for an eval this pm, however pt reporting she doesn't feel well and requesting return later this pm. Will re-attempt as time allows. PT made 2nd attempt to see the pt for an eval this pm, however pt again politely refusing.  R

## 2019-01-04 NOTE — PROGRESS NOTES
[facility] Hospitalist Progress Note     PCP: Domiinc Hernandez MD    SUBJECTIVE:  No CP, SOB, or N/V. Pt states that she continues to feel weak.   Hasn't moved to know if sob but ok at rest.    OBJECTIVE:  Temp:  [97.9 °F (36.6 °C)-98.6 °F (37 °C)] 97.9 °F (36.6 °C) 50 mg Oral Daily   • PEG 3350  17 g Oral Daily   • enoxaparin  1 mg/kg Subcutaneous 2 times per day       HYDROcodone-acetaminophen, acetaminophen, magnesium hydroxide, bisacodyl, FLEET ENEMA, ondansetron HCl, Metoclopramide HCl, Heparin Lock Flush family by bedside.     Total Time spent with patient and coordinating care:  35 minutes    Thank Raven Urias Res [facility] Hospitalist  Pager: [phone]  Answering Service: [phone]

## 2019-01-04 NOTE — PROGRESS NOTES
NURSING ADMISSION NOTE      Patient admitted via Cart  Oriented to room. Safety precautions initiated. Bed in low position. Call light in reach. Admitted from ER. Patient c/o mild pain on left upper abdomen.  Respirations non-labored,lungs sound dim

## 2019-01-04 NOTE — PAYOR COMM NOTE
--------------  ADMISSION REVIEW     Payor: [address] #:  [id number]  Authorization Number: [id number]    Admit date: 1/3/19  Admit time: [address] 21       Admitting Physician: Ming Catalan MD  Attending Physician:  Pauly Shepherd kg/m²      ABDOMEN: + Bowel sounds, soft, slight abdominal distention, no  masses palpated. No tenderness. .  No rebound, no guarding, no hepatosplenomegaly.        ED Course     Labs Reviewed   COMP METABOLIC PANEL (14) - Abnormal; Notable for the followin hospitalist.  I also spoke with Dr. Dylan Souza who recommended transfuse 1 unit of blood. Patient be admitted for further evaluation. Patient was stable during her stay in the emergency department still slightly tachycardic.       Disposition and Plan     Cli helping rule out PNA  -check UA/Ucx    Sinus tachycardia, hypotension  -no s/o sepsis; lactate normal  -worse with dehydration  -cont IVFs; observe on tele  -infectious w/u as above  -CTA chest and LE dopplers to r/o PE/DVT given elevated D dimer    [place] 7.8*   MG  1.4*   GLU  80       Generalized weakness   -due to cancer, chemo, anemia & PEs  -supportive care and will PT/OT  -CXR with concern for loculated effusion/process.  PCT is negative helping rule out PNA  -check UA/Ucx - will send - has not yet be WITH [facility] [employer] S/P TRANSFUSION - ACTIVE pe AND DVT. THANK YOU.

## 2019-01-04 NOTE — PLAN OF CARE
Maintains optimal cardiac output and hemodynamic stability Progressing      Electrolytes maintained within normal limits Progressing      Achieves optimal ventilation and oxygenation Progressing    Awake and oriented, fair appetite, no  Complaints of pain

## 2019-01-04 NOTE — DIETARY MALNUTRITION NOTE
[facility]    NUTRITION INITIAL ASSESSMENT    Pt meets severe malnutrition criteria.     CRITERIA FOR MALNUTRITION DIAGNOSIS:  Criteria for severe malnutrition diagnosis: chronic illness related to wt loss greater than 7.5% in 3 months, energy intake l who presents feeling increasingly weak, with associated sob. ANTHROPOMETRICS:  Ht: 157.5 cm (5' 2\")  Wt: 83 kg (183 lb) . This is 166 % of IBW  BMI:Body mass index is 33.47 kg/m².  .  IBW: 50 kg  Usual Body Wt: 106 kg    WEIGHT HISTORY:   Wt Readin

## 2019-01-04 NOTE — OCCUPATIONAL THERAPY NOTE
Attempted to see pt this PM, pt reporting that she doesn't feel well and needs to rest, OT will follow up as schedule permits.

## 2019-01-04 NOTE — CM/SW NOTE
01/04/19 1500   CM/SW Screening   Referral Source    Information Source Chart review;Nursing rounds   Patient's Mental Status Alert;Oriented   Patient's [address]   Patient lives with Spouse   Patient Status Prior to Admission

## 2019-01-04 NOTE — CONSULTS
[facility] Note:  Enoxaparin (LOVENOX) Dosing    Pharmacy has been consulted by Dr. Yasmeen Santana to dose enoxaparin Link [place]) for patient Zhao Calvo for treatment of DVT/PE.     Estimated Creatinine Clearance: 121.5 mL/min (A) (based on SCr of 0.37

## 2019-01-04 NOTE — BH PROGRESS NOTE
Went to see the pt multiple times today and she was busy with multiple disciples. Now went in and the pt states she doesn't feel well. She asked if someone could see her tomorrow.   She was told of course and the week-end liaison will check on her tomorro

## 2019-01-04 NOTE — CONSULTS
[facility]  Report of Consultation    Miriam Jarquin Patient Status:  Inpatient    1955 MRN [id number]   [facility] 4NW-A Attending Baylee Zazueta, DO   Hosp Day # 1 PCP Jesus Esquivel MD     Reason for Consultation:  VTE, hx of me today.     Voiding spontaneously, no accurate recording overnight     DATA:  Recent Labs   11/08/18  0400 11/07/18  0400 11/06/18  1945 11/06/18  1309   WBC 4.5 4.2 4.4 5.5   HGB 9.3* 10.5* 10.8* 11.3*   MCV 79.6* 78.9* 79.2* 78.6*   * 178 193 216 stable for discharge home after chemo     Avila Watters M.D. PGY1  OB/GYN  WI#[id number]        Associated attestation - Danielle Lozoya MD - 11/08/2018 8:11 AM CST    I saw and examined the patient.  I agree with the findings and the documented plan of 8.6 mg, 8.6 mg, Oral, BID  •  Sertraline HCl (ZOLOFT) tab 50 mg, 50 mg, Oral, Daily  •  PEG 3350 (MIRALAX) powder packet 17 g, 17 g, Oral, Daily  •  acetaminophen (TYLENOL) tab 650 mg, 650 mg, Oral, Q6H PRN  •  magnesium hydroxide (MILK OF MAGNESIA) 400 MG 1.4 01/04/2019       Imaging:  Ct Angiography, Chest (cpt=71275)    Result Date: 1/3/2019  CONCLUSION:  1. Subsegmental and segmental right pulmonary emboli noted as described above. Right lower lobe pulmonary infarct.   The critical test results were corine Fibroid     Gall stone     Subclavian vein thrombosis (HCC)     Internal jugular vein thrombosis, right (HCC)     Acute deep vein thrombosis (DVT) of axillary vein of right upper extremity (HCC)     Thyroid nodule     Endometrial cancer (HCC)     Hypokalem

## 2019-01-05 NOTE — PROGRESS NOTES
Level of Care Assessment Note    General Questions  Why are you here?: \"I have stage 4 Uterine cancer, I am sick, I am tired, but I am not going to kill my self. \"  Precipitating Events: Patient complained of abdominal pain, currently undergoing chemother Denies    Danger to Others/Property  Have you harmed someone or had thoughts about wanting someone harmed or killed in the past 30 days?: No  Have you harmed someone or had thoughts about wanting someone harmed or killed further back than 30 days?: No  Cur Functional Impairment  Currently Attending School: No  Employment Status: Employed  Concerns/Conflicts with Social Relationships: No  Decreased Functional Ability: Other (comment)(Patient stated she has no energy to do stuff.)  Do you have any prior/ Patient denies any suicide attempt in the past.    Risk/Protective Factors  Protective Factors: \"My family, my  and my sister that I can talk to. \"         Level of Care Recommendations  Consulted with: Polly Mendez (ARC lead)  Level of Care Recom

## 2019-01-05 NOTE — PHYSICAL THERAPY NOTE
Attempted to see patient this am.  She politely but adamantly refused therapy. Encouraged her to be as mobile and active during the day. Will attempt as schedule permits. RN aware.

## 2019-01-05 NOTE — DISCHARGE SUMMARY
General Medicine Discharge Summary     Patient ID:  Marilyn Tao  58year old  1/21/1955    Admit date: 1/3/2019    Discharge date and time: 1/5/19    Attending Physician: Lynda Christensen DO     Primary Care Physician: Mitch Rosales MD     Reason for adm outpatient     Hypokalemia  -replete per protocol     Pancytopenia due to chemo for uterine cancer  -follow counts; transfused 1U PRBCs - hgb without appropriate response - will order repeat cbc for the am - may require additional transfusion     Metastati Greater than 30 minutes    Patient had opportunity to ask questions and state understand and agree with therapeutic plan as outlined above.      Thank Zhang Ragsdale MD

## 2019-01-07 NOTE — PAYOR COMM NOTE
--------------  DISCHARGE REVIEW    Payor: [address] #:  [id number]  Authorization Number: [id number]    Admit date: 1/3/19  Admit time:  9788  Discharge Date: 1/5/2019  1:31 PM    Admitting Physician: Madhav Yoon normal  -worse with dehydration, anemia but also due to PEs  -cont IVFs; observe on tele  -infectious w/u as above  -CTA chest with PEs and LE dopplers with DVT - pt missed her xarelto for ~5 days due to not eating     +Acute PEs/DVTs  -now on lovenox   -e daily.    PEG 3350 Oral Powd Pack  Take 17 g by mouth daily. Omeprazole 40 MG Oral Capsule Delayed Release  Take 1 capsule (40 mg total) by mouth daily.  Before meal    Multiple Vitamins-Minerals (TAB-A-SARIAH MAXIMUM) Oral Tab  Take 1 tablet by mouth michelle from our perspective. I would advocate for this given risk of nosocomial infection. She has set follow up at [facility] next Friday.      Thank you for allowing me to participate in the care of your patient.     Nancy Cordova  1/4/2019  1:37 PM                Aracelis supplements, discussed ways to increase calories and protein in diet. Pt to have cooking service cook for her starting next week. RD contact information and handout provided.    58year old female with PMH sig for metastatic uterine cancer undergoing weekly to PEs  -cont IVFs; observe on tele  -infectious w/u as above  -CTA chest with PEs and LE dopplers with DVT - pt missed her xarelto for ~5 days due to not eating     +Acute PEs/DVTs  -now on lovenox   -echo ordered  -heme and pulm asked to eval laz cope

## 2019-02-01 PROBLEM — R53.1 WEAKNESS GENERALIZED: Status: ACTIVE | Noted: 2019-01-01

## 2019-02-01 PROBLEM — D64.9 ANEMIA, UNSPECIFIED TYPE: Status: ACTIVE | Noted: 2019-01-01

## 2019-02-01 PROBLEM — R18.0 ASCITES, MALIGNANT: Status: ACTIVE | Noted: 2019-01-01

## 2019-02-01 PROBLEM — E86.0 DEHYDRATION: Status: ACTIVE | Noted: 2019-01-01

## 2019-02-01 NOTE — ED INITIAL ASSESSMENT (HPI)
Pt has had excessive weakness for the past seven days. She was unable to walk even a short distance to her car. Nausea and emesis x1 this am. SOB/LANETTE w/wo exertion since last night. Last chemo was three Fridays ago.  Denies palpitation,  on the monit

## 2019-02-01 NOTE — ED PROVIDER NOTES
Patient Seen in: [facility] Emergency Department    History   Patient presents with:  Fatigue (constitutional, neurologic)  Nausea/Vomiting/Diarrhea (gastrointestinal)    Stated Complaint: weakness, nausea/vomiting. cancer patient    HPI    Patient ovarian cysts    • Thyroid nodule    • Uterine cancer ([facility] [address]     2016   • Uterine cancer ([facility] [address]     11/2017 / chemo finished 4/2018 / ct scan cleared up    • Uterine fibroid    • Vitamin D deficiency        Past Surgical History:   Procedure Laterality Date Distal pulses are strong and symmetric. Abdomen: Protuberant but really not tender. Ascites noted. Extremities: Unremarkable. Calves symmetric with only trace ankle edema  Neurologic:  Mental status as above. Cranial nerves as noted above.   Patient mo WITH PLATELET.   Procedure                               Abnormality         Status                     ---------                               -----------         ------                     CBC W/ DIFFERENTIAL[[id number]]          Abnormal            Final urination. I will treat with antibiotic for possible early UTI. It seems her primary problem was the dehydration. She is feeling better and tolerated p.o. fluid challenge. I offered a more prolonged period of observation; patient declined.     I discuss

## 2019-02-01 NOTE — H&P
[facility] Hospitalist H&P       CC: Patient presents with:  Fatigue (constitutional, neurologic)  Nausea/Vomiting/Diarrhea (gastrointestinal)       PCP: Colton Montenegro MD    History of Present Illness:  Patient is a 71year old female with PMH sig for metastatic mouth daily.  Before meal Disp: 30 capsule Rfl: 11       Scheduled Medication:    Continuous Infusing Medication:  • sodium chloride Stopped (02/01/19 2288)     PRN Medication:       Soc Hx  Social History    Tobacco Use      Smoking status: Former Smoker performed through the pulmonary arterial anatomy. 3D volume renderings are generated. Dose reduction techniques were used.  Dose information is transmitted to the ACR ([facility]) [address]) which includes th x 1.3 cm. There is a 2nd lesion in the posterior segment of the right lobe measuring 9 x 10 mm which is approximately unchanged. Findings are consistent with hepatic metastatic disease.   right lobe hepatic mass BONES:  Sclerotic focus identified within t junction, and posterior tibial veins. PATIENT STATED HISTORY: (As transcribed by Technologist)  Patient has an elevated D dimer. FINDINGS:  Occlusive clot seen within proximal right posterior tibial vein, as well is in the right peroneal vein.   Remaind moderate left pleural effusion. There is an apparent straight line pleural fluid level suggesting a possible hydro pneumothorax. There is some left basilar airspace disease which could reflect adjacent atelectasis.   There is some airspace infiltrate in t

## 2019-02-02 NOTE — CONSULTS
[facility]  Report of Consultation    Nicolas Morales Patient Status:  Inpatient    1955 MRN [id number]   [facility] 4NW-A Attending Mariana Rajan, [address] Day # 1 PCP Charlette Nunez MD     Reason for Consultation:  Metastatic Allergies    Medications:    Current Facility-Administered Medications:   •  0.9%  NaCl infusion, , Intravenous, Continuous  •  ondansetron HCl (ZOFRAN) injection 4 mg, 4 mg, Intravenous, Q6H PRN  •  Pantoprazole Sodium (PROTONIX) EC tab 40 mg, 40 mg, Oral CA 7.9 02/02/2019    ALB 1.9 02/01/2019    ALKPHO 109 02/01/2019    BILT 0.4 02/01/2019    TP 5.9 02/01/2019    AST 25 02/01/2019    ALT 29 02/01/2019    PTT 35.3 02/01/2019    INR 1.51 02/01/2019    PTP 18.8 02/01/2019    TROP <0.046 02/01/2019       Imag

## 2019-02-02 NOTE — PROGRESS NOTES
[facility] Hospitalist Progress Note                                                                   [address]  1/21/1955    SUBJECTIVE: pt stats she is not urinating much - has no urge t with ?consolidation. No s/s of PNA.  Cont to monitor  - UA is abnormal - ceftriaxone pending UCx  - orthostatics positive --> cont IVF  - PO intake as tolerated  - paracentesis on Monday     #Decreased UOP  -bladder scan unreliable  -check bladder US  -stra

## 2019-02-02 NOTE — PLAN OF CARE
GASTROINTESTINAL - ADULT    • Maintains or returns to baseline bowel function Not Progressing    • Maintains adequate nutritional intake (undernourished) Not Progressing        METABOLIC/FLUID AND ELECTROLYTES - ADULT    • Electrolytes maintained within no

## 2019-02-02 NOTE — PHYSICAL THERAPY NOTE
PHYSICAL THERAPY EVALUATION - INPATIENT     Room Number: 407/407-A  Evaluation Date: 2/2/2019  Type of Evaluation: Initial  Physician Order: PT Eval and Treat    Presenting Problem: weakness, ascites, falls  Reason for Therapy: Mobility Dysfunction a over sock that was not fully on, other 2 falls due to weakness.  is retired and home with her and able to assist as needed. Pt reports significant difficulty getting up from floor after falling, even with 's assistance.  Paramedics were called How much help from another person does the patient currently need. ..   -   Moving to and from a bed to a chair (including a wheelchair)?: A Little   -   Need to walk in hospital room?: A Little   -   Climbing 3-5 steps with a railing?: A Lot       AM-PAC overall the evaluation complexity is considered moderate. These impairments and comorbidities manifest themselves as functional limitations in independent bed mobility, transfers, and gait.   The patient is below baseline and would benefit from skilled inp

## 2019-02-02 NOTE — DIETARY MALNUTRITION NOTE
[facility]    NUTRITION INITIAL ASSESSMENT    Pt meets malnutrition criteria.     CRITERIA FOR MALNUTRITION DIAGNOSIS:  Criteria for non-severe malnutrition diagnosis: chronic illness related to wt loss 10% in 6 months, energy intake less than75% for g 102.1 kg (225 lb 2 oz)  09/13/18 : 105.7 kg (233 lb)        NUTRITION:  Diet: General  Oral Supplements: ensure compact 2 with each meal    FOOD/NUTRITION RELATED HISTORY:  Appetite: Poor  Intake: 50-75%  Intake Meeting Needs: Marginal, added supplements

## 2019-02-02 NOTE — PLAN OF CARE
Pt has no urge to urinate last uop was overnight and was 100ml. Pt has iv fluids infusing. Legs and abdomen are swollen. Pt bladder scan per scan 300ml however pt has ascites. Pt straight cath at 1600 and 125ml urine received.  MD Notified Pt has a kidney ul

## 2019-02-02 NOTE — PLAN OF CARE
Pt admitted from ER alert x4 c/o weakness and ascites. Admission orders recd. Dr Sarah Mckenzie notified with admission. Ok per hem onc to hold xarelto.  Pt in bed report given to night shift

## 2019-02-03 NOTE — PROGRESS NOTES
[facility]  Progress Note    Marilyn Tao Patient Status:  Inpatient    1955 MRN [id number]   [facility] 4NW-A Attending Rosanna Zamora, DO   Hosp Day # 2 PCP Mitch Rosales MD     Subjective:  Feeling OK.  Pain controlled at t

## 2019-02-03 NOTE — PROGRESS NOTES
[facility] Hospitalist Progress Note                                                                   [address]  1/21/1955    SUBJECTIVE: still uncomfortable with ascites. Eric Fails for tap.  U as culture shows no growth  - orthostatics positive -- recived IVF  - PO intake as tolerated  - paracentesis on Monday     #Decreased UOP  -bladder scan unreliable, US done - cant visualize bladder  -straight cath with minimal output  -bmp stable, now impr

## 2019-02-03 NOTE — PLAN OF CARE
1930 received patient in handoff. Requesting Norco for pain. Rated pain 10/10. Inquired as to why she does not take during day for better pain management she said \" I want to be able to function\". Was inc of urine in brief.  Patient had dinner tray at bed

## 2019-02-04 NOTE — CM/SW NOTE
SW spoke w/Vi at [facility] who stated the pt is out of network, but stated she has out of network benefits and would be covered at 100%. Informed Vi the pt will be ready tomorrow and to get auth.  Vi stated The [facility] does not have any open beds today, b

## 2019-02-04 NOTE — PLAN OF CARE
1900 received patient in handoff. She seemed more animated and in better spirits. Hoping to have paracentesis and go home in am. IV fluids at 75cc/hr. Legs and thighs are much more swollen today. MD aware and IV solution and rate changed on days. Resting com

## 2019-02-04 NOTE — IMAGING NOTE
No pulse ox used    4 litres removed, right lower quadrant, Above findings called to nuse at 10:26am

## 2019-02-04 NOTE — CM/SW NOTE
02/04/19 1100   CM/SW Referral Data   Referral Source Social Work (self-referral)   Reason for Referral Discharge planning   Informant Patient   Readmission Assessment   Factors that patient feels contributed to this readmission (weakness)   Patient Inf

## 2019-02-04 NOTE — PLAN OF CARE
Pt alert x4 urinated 200cc at 5pm. K replaced per protocol Pt sat in chair for a few hours,  at bedside. C/o abdominal fullness. Social work to see tomorrow pt has some needs for home. Report given to night shift.

## 2019-02-04 NOTE — PHYSICAL THERAPY NOTE
PHYSICAL THERAPY TREATMENT NOTE - INPATIENT    Room Number: 407/407-A     Session: 1/5   Number of Visits to Meet Established Goals: 5    Presenting Problem: weakness, ascites, falls     History related to current admission: Pt admitted for weakness, fall arm  BP Method: Automatic  Patient Position: Sitting    O2 WALK                    AM-PAC '6-Clicks' INPATIENT SHORT FORM - BASIC MOBILITY  How much difficulty does the patient currently have. ..  -   Turning over in bed (including adjusting bedclothes, she and concerns addressed; Discussed recommendations with /    ASSESSMENT   Pt seen this pm for treatment and demonstrating slow progress towards goals.  Pt demo's mod impairment in force generating capacity, endurance, strength, gait a

## 2019-02-04 NOTE — PLAN OF CARE
Pt is alert and oriented x 4. Vitals stable. C/o abdominal pain but does not want to take pain meds. Wants to take pain med only at night time. Denies any nausea or vomiting,. Both lower legs are swollen . S/p Paracentesis.  Band Aid to Rt Mid lower abdomen

## 2019-02-04 NOTE — PROGRESS NOTES
[facility]  Progress Note    Liberty Singleton Patient Status:  Inpatient    1955 MRN [id number]   [facility] 4NW-A Attending Erlin Braxton, DO   Hosp Day # 3 PCP Tuyet Bates MD     Subjective:    No new complaints  Paracentesi

## 2019-02-05 NOTE — CM/SW NOTE
BELLO spoke with Gerson Bean and she is agreeable to [facility] at [facility] until [facility] has bed availability. Upon speaking with admissions at [facility], they will accept patient when bed is available.      Adriana Stevens, 02/05/19, 2:47 PM

## 2019-02-05 NOTE — OCCUPATIONAL THERAPY NOTE
OCCUPATIONAL THERAPY EVALUATION - INPATIENT     Room Number: 407/407-A  Evaluation Date: 2/5/2019  Type of Evaluation: Initial  Presenting Problem: dehydration    Physician Order: IP Consult to Occupational Therapy  Reason for Therapy: ADL/IADL Dysfunction go out for dr appointments. Reports 3 falls in past 2 weeks, first one due to tripping over sock that was not fully on, other 2 falls due to weakness.  is retired and home with her and able to assist as needed.  Pt reports significant difficulty gett LIVING ASSESSMENT  AM-PAC ‘6-Clicks’ Inpatient Daily Activity Short Form  How much help from another person does the patient currently need…  -   Putting on and taking off regular lower body clothing?: A Lot  -   Bathing (including washing, rinsing, drying activities of daily living, rest and sleep, work, leisure and social participation.      The patient is functioning below her previous functional level and would benefit from skilled inpatient OT to address the above deficits, maximizing patient’s ability t

## 2019-02-05 NOTE — PHYSICAL THERAPY NOTE
PHYSICAL THERAPY TREATMENT NOTE - INPATIENT    Room Number: 407/407-A     Session: 2   Number of Visits to Meet Established Goals: 5    Presenting Problem: weakness, ascites, falls     History related to current admission: Pt admitted for weakness, falls, TOLERANCE                         O2 WALK                    AM-PAC '6-Clicks' INPATIENT SHORT FORM - BASIC MOBILITY  How much difficulty does the patient currently have. ..  -   Turning over in bed (including adjusting bedclothes, sheets and blankets)?: No prevention. RN made aware of above. /56 supine  BP 87/59 seated  BP 84/57 p seated exercise  BP 69/47 standing  BP 99/60 semi sup end of session    Patient End of Session: In bed;Needs met;Call light within reach;RN aware of session/findings; All pa

## 2019-02-05 NOTE — PROGRESS NOTES
[facility] Hospitalist Progress Note                                                                   [address]  1/21/1955    SUBJECTIVE: seen post paracentesis, feels better.      OBJECTIV positive -- recieved IVF  - PO intake as tolerated    # Abd distention 2/2 ascites: sp paracentesis today- much improved    #Decreased UOP  -bladder scan unreliable, US done - cant visualize bladder  -straight cath with minimal output  -bmp stable, now imp

## 2019-02-05 NOTE — PROGRESS NOTES
[facility]  Progress Note    Janet Eldridge Patient Status:  Inpatient    1955 MRN [id number]   [facility] 4NW-A Attending Laure Ridley, DO   Hosp Day # 4 PCP Dominic Hernandez MD     Subjective:    No new complaints  Paracentesi Absolute 0.03 0.00 - 1.00 x10(3) uL    Neutrophil % 61.3 %    Lymphocyte % 20.1 %    Monocyte % 16.2 %    Eosinophil % 0.9 %    Basophil % 0.6 %    Immature Granulocyte % 0.9 %   RBC MORPHOLOGY SCAN    Collection Time: 02/05/19 12:02 PM   Result Value Ref

## 2019-02-05 NOTE — PROGRESS NOTES
[facility] Hospitalist Progress Note                                                                   [address]  1/21/1955    SUBJECTIVE:   Feels well today but LH with standing- + orthost unspecified type    Weakness generalized    Ascites, malignant      # Generalized weakness/fatigue in setting of metastatic uterine CA  - apprec oncology review   - CXR with ?consolidation. No s/s of PNA.  Cont to monitor  - UA is abnormal - ceftriaxone now

## 2019-02-05 NOTE — PAYOR COMM NOTE
--------------  ADMISSION REVIEW     Payor: [address] #:  [id number]  Authorization Number:  [id number]    Admit date: 2/1/19  Admit time: 9934       Patient Seen in: [facility] Emergency Department    History   S • MENOPAUSE    • OBESITY    • Other ovarian cysts    • Thyroid nodule    • Uterine cancer ([facility] [address]     2016   • Vitamin D deficiency      Past Surgical History:   Procedure Laterality Date   • COLONOSCOPY  2007    nl per pt   • HYSTEROSCOPY  2001 or 2004 RBC 3.38 (*)     HGB 9.3 (*)     HCT 28.0 (*)     RDW 23.5 (*)     RDW-SD 67.9 (*)     Lymphocyte Absolute 0.80 (*)     All other components within normal limits   TROPONIN I - Normal   URINE CULTURE, ROUTINE     EKG ST with some nonspecific flattened T wa Seth.   She will admit      Disposition and Plan   Clinical Impression:  Dehydration  (primary encounter diagnosis)  Hypokalemia  Anemia, unspecified type  Weakness generalized  Ascites, malignant      [facility] Hospitalist H&P     CC: Patient presents with: Nausea improved.  Tolerating liquid diet.      OBJECTIVE:  Temp:  [97.5 °F (36.4 °C)-97.7 °F (36.5 °C)] 97.5 °F (36.4 °C)  Pulse:  [102-125] 107  Resp:  [16-20] 20  BP: ()/(38-71) 101/69    Lab  02/01/19   1044  02/02/19   0645   WBC  5.0  4.2   HGB energy as evidenced by 25% wt loss over 6 months, poor po intake x >1 month, and mild depletion of body fat and muscle    2/3:    SUBJECTIVE: still uncomfortable with ascites. Charissa Gutter for tap. Urination improved.  No new complaints  OBJECTIVE:  Temp:  [97.5 °

## 2019-02-05 NOTE — PLAN OF CARE
Received patient in handoff. Declined pain meds initially but then requested 2 tabs at HS for discomfort in r side of abdomen and back. States pain is much improved however with the paracentesis.  Offers no complaints this am.

## 2019-02-05 NOTE — CM/SW NOTE
The [address] [place] [address] will not have bed availability until Monday. Patient informed and referral sent to [facility].      Zee Mora, 02/05/19, 2:18 PM

## 2019-02-06 NOTE — OCCUPATIONAL THERAPY NOTE
Attempted to see pt this AM, pt with chest pain and not up to therapy at this time, RN aware. Attempted to see pt this PM, pt with continued pain and just receiving lunch, RN aware. OT will follow up at later date.

## 2019-02-06 NOTE — CONSULTS
[facility] [facility] Status:  [facility]    1955 MRN [id number]   [facility] 4NW-A Attending Clois Party   Hosp Day # 5 PCP Bere Monroy MD     Date of Admission: 2019 10:05 AM  Admission Diagnos Heart Disorder Mother    • Arthritis Mother    • Heart Disease Maternal Grandmother         PVD         Home Medications:    Outpatient Medications Marked as Taking for the 2/1/19 encounter [facility] Encounter):  Sulfamethoxazole-TMP -160 MG Oral Tab denies arthralgias, myalgias, muscle weakness, or joint swelling  Skin: denies rash or pruritis; no jaundice  Neurologic: denies numbness, weakness, ataxia, tremors, or vertigo  Psychiatric: denies insomnia, depression, anxiety, or drug abuse  Endocrine: d 9 02/06/2019    CREATSERUM 0.38 02/06/2019    GLU 78 02/06/2019    CA 8.1 02/06/2019     Lab Results   Component Value Date     02/06/2019    K 3.9 02/06/2019    K 3.9 02/06/2019     02/06/2019    CO2 22.0 02/06/2019    BUN 9 02/06/2019    CREA

## 2019-02-06 NOTE — PROGRESS NOTES
[facility] Hospitalist Progress Note                                                                   [address]  1/21/1955    SUBJECTIVE:   IVF given yesterday 2/2 sig orthostatic hypotens PRN: Heparin Lock Flush, ondansetron HCl, Alum & Mag Hydroxide-Simeth, HYDROcodone-acetaminophen **OR** HYDROcodone-acetaminophen    Assessment/Plan:  Principal Problem:    Dehydration  Active Problems:    Hypokalemia    Anemia, unspecified type    Wea

## 2019-02-06 NOTE — TELEPHONE ENCOUNTER
Received a call from [facility] hospitalist Dr Efra Elliott. Patient is admitted to the hospital. Patient has been seeing Dr Dylan Souza and getting chemo at [facility]. Dr Efra Elliott reports patient no longer wants to travel to [facility] and prefers to come to [place].  Discussed a

## 2019-02-06 NOTE — PROGRESS NOTES
[facility]  Progress Note    Liberty Singleton Patient Status:  Inpatient    1955 MRN [id number]   [facility] 4NW-A Attending Erlin Braxton, DO   Hosp Day # 5 PCP Tuyet Bates MD     Subjective:    Not feeling well  SOB and some American 112 >=60    GFR, -American 129 >=60    AST 23 15 - 41 U/L    Alt 24 14 - 54 U/L    Alkaline Phosphatase 90 50 - 130 U/L    Bilirubin, Total 0.3 0.1 - 2.0 mg/dL    Total Protein 4.5 (L) 6.4 - 8.2 g/dL    Albumin 1.3 (L) 3.1 - 4.5 g/dL    Viktoria admitted for a paracentesis.      1. Endometrial cancer  -No active therapy as inpatient  -Paracentesis done      2. History of DVT  -On Xarelto    3.  Pleural effusion - related to endometrial cancer   Thoracentesis planned     We will follow      [address]

## 2019-02-06 NOTE — PLAN OF CARE
CARDIOVASCULAR - ADULT    • Maintains optimal cardiac output and hemodynamic stability Not Progressing        GASTROINTESTINAL - ADULT    • Maintains adequate nutritional intake (undernourished) Not Progressing        Impaired Functional Mobility    • Achi

## 2019-02-06 NOTE — DIETARY MALNUTRITION NOTE
[facility]    NUTRITION FOLLOW-UP ASSESSMENT    Pt meets non-severe malnutrition criteria.     CRITERIA FOR MALNUTRITION DIAGNOSIS:  Criteria for non-severe malnutrition diagnosis: chronic illness related to wt loss 10% in 6 months, energy intake less reports a gag reaction with \"bread foods\", like pancakes, toast, muffin, etc. Patient reports regular BM before medications were initiated, currently c/o constipation.  Patient reports feeling nauseous at times of drinking beverages, and reports that she prescription  2. At least 75% intake of oral supplements  3. No signs of skin breakdown  4.  Maintain lean body mass    MEDICATIONS:  Noted    LABS:  Noted    Pt is at moderate nutrition risk    FOLLOW-UP DATE: 2/11/19    Froylan Rangel  Dietetic Intern

## 2019-02-06 NOTE — CONSULTS
[facility]    Report of Consultation    Shelleymarline Stone Patient Status:  Inpatient    1955 MRN [id number]   [facility] 4NW-A Attending Alexia Chang,*   Hosp Day # 5 PCP Donny Calles MD       REASON FOR CONSULT:     Hyp used smokeless tobacco. She reports that she does not drink alcohol or use drugs.     ALLERGIES:     No Known Allergies    MEDICATIONS:       Current Facility-Administered Medications:   •  Heparin Lock Flush 100 UNIT/ML lock flush 500 Units, 5 mL, Intercat ANIONGAP 11 02/06/2019     10/25/2016    GFRNAA 112 02/06/2019    GFRAA 129 02/06/2019    CA 8.1 (L) 02/06/2019    OSMOCALC 302 (H) 02/06/2019    ALKPHO 90 02/06/2019    AST 23 02/06/2019    ALT 24 02/06/2019    BILT 0.3 02/06/2019    TP 4.5 (L) 02/ and vomiting. Nephrology consulted for hypervolemic hypernatremia in setting of hypoalbuminemia and relative hypotension.     -- 5% albumin  -- hold diuretics for now, this will worsen hypotension and hypernatremia  -- encouraged to increase PO intake  -- a

## 2019-02-07 NOTE — CM/SW NOTE
BANG met w/pt who confirmed The [facility] is her first choice. She is agreeable to [place] if [email or web address] does not have a bed available when she is ready to dc. Possible dc this weekend.

## 2019-02-07 NOTE — CM/SW NOTE
SW informed [facility] to keep the on their wait list since she may not dc until the weekend. Elisa Salinas confirmed they are able to accept the pt and have insurance auth. Pt made aware she can transfer from [facility] to the [facility] once a bed is available.

## 2019-02-07 NOTE — CM/SW NOTE
Interdisciplinary Rounds: 02/07/19  Admitted: 2/1/2019 LOS: 6  Disciplines in attendance: charge nurse, staff nurse, CM, [address] and discharge plan reviewed. [facility].     Active issues needing resolution prior to discharge: Xarelto on h

## 2019-02-07 NOTE — PHYSICAL THERAPY NOTE
IP PT attempted, pt refusing 2/2 cramping . Pt states she is attempting to have a BM, however, refuses to t/f to commode. Will re-attempt as schedule permits.

## 2019-02-07 NOTE — PROGRESS NOTES
[facility]    Nephrology Progress Note    Early Bonlisa Attending:  Batsheva Yeager,*       SUBJECTIVE:     Has shortness of breath and chest pain, asking for thoracentesis to be done. + constipation.   States she is urinating more since r 02/06/2019    MCHC 32.5 02/06/2019    RDW 24.2 (H) 02/06/2019    NEPRELIM 2.00 02/06/2019    NEPERCENT 57.9 02/06/2019    LYPERCENT 23.2 02/06/2019    MOPERCENT 16.5 02/06/2019    EOPERCENT 0.6 02/06/2019    BAPERCENT 0.6 02/06/2019    NE 2.00 02/06/2019 with lightheadedness, weakness and vomiting.  Nephrology consulted for hypervolemic hypernatremia in setting of hypoalbuminemia and relative hypotension.     -- 5% albumin yesterday, awaiting repeat labs today to determine whether more is warranted  -- hold

## 2019-02-07 NOTE — PROGRESS NOTES
[address] Patient Status:  Inpatient    1955 MRN [id number]   [facility] 4NW-A Attending Kyaw Chance,*   Hosp Day # 6 PCP Orinda Carrel, MD     Pulm / Critical Care Progress Note     S: reports some Labs   Lab  02/03/19   0623  02/04/19   0533  02/05/19   1202  02/06/19   0609   NA  147*   --   144  147*   K  3.6  3.7  3.7  3.9  3.9   CL  115*   --   114*  114*   CO2  22.0   --   20.0*  22.0   BUN  12   --   8  9     Creatinine, Serum (mg/dL)   Date V

## 2019-02-07 NOTE — PLAN OF CARE
GASTROINTESTINAL - ADULT    • Maintains adequate nutritional intake (undernourished) Not Progressing          CARDIOVASCULAR - ADULT    • Maintains optimal cardiac output and hemodynamic stability Progressing        RESPIRATORY - ADULT    • Achieves optima

## 2019-02-07 NOTE — OCCUPATIONAL THERAPY NOTE
IP OT attempted, pt refusing 2/2 cramping . Pt states she is attempting to have a BM, however, refuses to t/f to commode. Will re-attempt as schedule permits.

## 2019-02-07 NOTE — PROGRESS NOTES
[facility] Hospitalist Progress Note                                                                   [address]  1/21/1955    SUBJECTIVE:   Feeling SOB again today but remains on room air Continuous Infusions:   • Continuous dose Heparin infusion Stopped (02/07/19 4565)     PRN: PEG 3350, Heparin Lock Flush, ondansetron HCl, Alum & Mag Hydroxide-Simeth, HYDROcodone-acetaminophen **OR** HYDROcodone-acetaminophen    Assessment/Plan:  Prin Xarelto post procedure    DISPO: Not ready for DC.     Have scheduled outpatient [place] [id number] [facility] with Dr. Viri Stockton and medical oncology per patient request.    Castillo Dorado MD  [facility] Hospitalist  Pager: [phone]

## 2019-02-07 NOTE — CM/SW NOTE
BANG reached out to PACT [phone] and spoke w/Bryan who confirmed she oversees all of the pt's care. She said she received an email from [facility] stating the pt needs LTC.  Deana Tay stated that is not the case and the [facility] is i

## 2019-02-08 NOTE — PROGRESS NOTES
[facility] Hospitalist Progress Note                                                                   [facility] [address]  1/21/1955    CC: FU    Interval History:  - JOHN wilde this morning  - breath UA is abnormal - ceftriaxone now DC as culture shows no growth  - orthostatics positive pm admit- IVFs on admit  - PO intake as tolerated- nutrition in poor- being seen by dietitian in house     # Abd distention 2/2 ascites: sp paracentesis 2/4/19- much im

## 2019-02-08 NOTE — PROGRESS NOTES
[facility]  Progress Note    Taryn Smith Patient Status:  Inpatient    1955 MRN [id number]   [facility] 4NW-A Attending Federico Carcamo, [address] [facility] Day # 7 PCP Richard Kate MD     Subjective:    Feels better post thoracentesi - 20 mg/dL    Creatinine 0.33 (L) 0.55 - 1.02 mg/dL    BUN/CREA Ratio 12.1 10.0 - 20.0    Calcium, Total 8.1 (L) 8.3 - 10.3 mg/dL    Calculated Osmolality 298 (H) 275 - 295 mOsm/kg    GFR, Non- 117 >=60    GFR, -American 135 >=60

## 2019-02-08 NOTE — OCCUPATIONAL THERAPY NOTE
0830 - Attempted to see pt this AM, pt still on BR after thoracentesis.     1040 - Attempted to see pt this AM, pt refusing stating there is just too much to do and she has too many things people want her to do and it is just too hard and asking therapists

## 2019-02-08 NOTE — PLAN OF CARE
Patient had albumin during shift, and potassium that was ordered from renal,no complaints, resting quietly in bed.

## 2019-02-08 NOTE — PLAN OF CARE
GASTROINTESTINAL - ADULT    • Maintains or returns to baseline bowel function Progressing        RESPIRATORY - ADULT    • Achieves optimal ventilation and oxygenation Progressing          VSS and afebrile. Lung sounds diminished.   Denies any SOB, on room

## 2019-02-08 NOTE — PROCEDURES
Thoracentesis Procedure Note    Pre-operative Diagnosis: pleural effusion    Post-operative Diagnosis: same    Indications: dyspnea    Procedure Details   Consent: Informed consent was obtained.  Risks of the procedure were discussed including: infection, b

## 2019-02-08 NOTE — PAYOR COMM NOTE
--------------  CONTINUED STAY REVIEW    Payor: [address] #:  [id number]  Authorization Number:  [id number]    Admit date: 2/1/19  Admit time: 1    Admitting Physician: Monique Frederick DO  Attending Physician:   Hemanth 2/8/2019 0900 Given 20 mEq Oral Eliza Mendoza RN      NEA [facility]) tab TABS 8.6 mg     Date Action Dose Route User    2/8/2019 0900 Given 8.6 mg Oral Eliza Mendoza RN            Plan: 2/6  1.  SOB: suspect 2/2 enlarging left sided effusion +P

## 2019-02-08 NOTE — PHYSICAL THERAPY NOTE
Attempted on 2/8/19 in am. Pt reports \"noone understands that I can't do everything people are asking me to do. \" Offered to return in afternoon and pt refused therapy on this day. Will follow up later in house.

## 2019-02-08 NOTE — PROGRESS NOTES
[facility]    Nephrology Progress Note    Taryn Smith Attending:  Carlos Parrish,*       SUBJECTIVE:     Chest pain improved after thoracentesis this morning  Feels abdomen is more swollen  Legs are about the same  BPs are borderline lo EOPERCENT 0.6 02/06/2019    BAPERCENT 0.6 02/06/2019    NE 2.00 02/06/2019    LYMABS 0.80 (L) 02/06/2019    MOABSO 0.57 02/06/2019    EOABSO 0.02 02/06/2019    BAABSO 0.02 02/06/2019     No results found for: BASIL Sneed, Veena Burr vomiting. Nephrology consulted for hypervolemic hypernatremia in setting of hypoalbuminemia and relative hypotension.  Now with hypokalemia also.     -- 5% albumin 250 ml BID  -- hold diuretics for now, this will worsen hypotension, hypokalemia and hypernat

## 2019-02-08 NOTE — PROGRESS NOTES
[address] Patient Status:  Inpatient    1955 MRN [id number]   [facility] 4NW-A Attending Al Barnhart,*   Hosp Day # 7 PCP Carson Kelly MD     Pulm / Critical Care Progress Note     S: remains dyspn 02/07/19   0920  02/08/19 0613   INR  1.21*  1.71*  1.93*         Recent Labs   Lab  02/06/19   0609  02/07/19   0920 02/08/19 0613   NA  147*  144  146*   K  3.9  3.9  3.1*  3.5*   CL  114*  111  113*   CO2  22.0  23.0  26.0   BUN  9  5*  4*     Crea

## 2019-02-08 NOTE — PROGRESS NOTES
[facility]  Progress Note    Halina Santos Patient Status:  Inpatient    1955 MRN [id number]   [facility] 4NW-A Attending Tika Lyn, DO   Hosp Day # 6 PCP Evelia Grewal MD     Subjective:    Same SOB    Objective:  Blood 161.8 (H) 26.1 - 34.6 seconds   PROTHROMBIN TIME (PT)    Collection Time: 02/07/19  9:20 AM   Result Value Ref Range    PT 20.7 (H) 12.4 - 14.7 seconds    INR 1.71 (H) 0.90 - 1.10         Imaging:          Medications reviewed.     • Albumin Human  250 mL I

## 2019-02-09 NOTE — PROGRESS NOTES
[facility]    Nephrology Progress Note    Aidee Benites Attending:  Ivana Ruiz,*       SUBJECTIVE:     Not eating much  Not short of breath  Feet are swollen, did not wear ace wraps  PHYSICAL EXAM:     Vital Signs: BP 98/54 (BP Locati 02/09/2019    MCH 28.5 02/09/2019    MCHC 34.1 02/09/2019    RDW 21.1 (H) 02/09/2019    NEPRELIM 3.23 02/09/2019    NEPERCENT 64.4 02/09/2019    LYPERCENT 13.8 02/09/2019    MOPERCENT 19.6 02/09/2019    EOPERCENT 0.8 02/09/2019    BAPERCENT 0.4 02/09/2019 history of metastatic endometrial cancer c/b ascites. PE/DVT presented with lightheadedness, weakness and vomiting. Nephrology consulted for hypervolemic hypernatremia in setting of hypoalbuminemia and relative hypotension.  Now with hypokalemia and hypopho

## 2019-02-09 NOTE — PROGRESS NOTES
[facility] Hospitalist Progress Note                                                                   [address]  1/21/1955    CC: FU    Interval History:  - PTT high- better this AM  - No shows no growth  - orthostatics positive pm admit- IVFs on admit  - PO intake as tolerated- nutrition in poor- being seen by dietitian in house     # Abd distention 2/2 ascites: sp paracentesis 2/4/19- much improved  - Though now abd is getting more full a [phone]

## 2019-02-09 NOTE — PLAN OF CARE
CARDIOVASCULAR - ADULT    • Maintains optimal cardiac output and hemodynamic stability Progressing    • Absence of cardiac arrhythmias or at baseline Progressing        No c/o chest pain today. HR in the 100s BP 100s/50s-60s.      GASTROINTESTINAL - ADULT

## 2019-02-09 NOTE — PROGRESS NOTES
Pulmonary Progress Note      NAME: Misti Lin - ROOM: 208/868-P - MRN: [id number] - Age: 71year old - : 1955    Assessment/Plan:  1.  SOB: suspect 2/2 enlarging left sided effusion +PE  -on [facility] for PE   -dyspnea and pain improved after thorace 0.38*  0.27*  0.33*  0.23*   GFRAA  129  144  135  152   GFRNAA  112  125  117  132   CA  8.1*  7.9*  8.1*  8.0*   ALB  1.3*  2.0*  2.1*  2.4*   NA  147*  144  146*  144   K  3.9  3.9  3.1*  3.5*  3.2*   CL  114*  111  113*  111   CO2  22.0  23.0  26.0  26

## 2019-02-09 NOTE — PROGRESS NOTES
A&O times 4. No complaints overnight. VSS and afebrile. Heparin gtt continued, redraw done at [facility] 3701, titratited per protocol. Heparin now infusing at 1150 units/hr. Next redraw at 0700. Pt sleeping comfortably, denies any pain.   Lung sounds diminished,

## 2019-02-10 NOTE — PROGRESS NOTES
[facility] Hospitalist Progress Note                                                                   [address]  1/21/1955    CC: FU    Interval History:  - Abd feels full  - Breathing sta admit- IVFs on admit  - PO intake as tolerated- nutrition in poor- being seen by dietitian in house     # Abd distention 2/2 ascites: sp paracentesis 2/4/19- much improved  - Abd very distended again  - Discussed with her primary oncologist Dr. Luke Arroyo at  pending her conversation with Dr. Ferreira.     Jeannie Hernandez MD  [facility] Hospitalist  Pager: [phone]

## 2019-02-10 NOTE — PLAN OF CARE
GASTROINTESTINAL - ADULT    • Maintains or returns to baseline bowel function Not Progressing    • Maintains adequate nutritional intake (undernourished) Not Progressing        Impaired Activities of Daily Living    • Achieve highest/safest level of indepe

## 2019-02-10 NOTE — PROGRESS NOTES
[address] [facility] Patient Status:  Inpatient    1955 MRN [id number]   [facility] 4NW-A Attending Vadim Willard,*   Hosp Day # 9 PCP Susy Han MD        SUBJECTIVE:Feels better. Denies dyspnea at risk.

## 2019-02-10 NOTE — PROGRESS NOTES
NPO possible paracentesis. VSS and afebrile. Denies any SOB. Lung sounds diminished. Heparin infusing @ 9ml/hr. Next redraw in AM. All needs met. Sleeping comfortably. Will monitor.

## 2019-02-10 NOTE — PROGRESS NOTES
Pt well know to me  Spoke with pt, son, sister and brother    CANCER HISTORY:  8/8/2016: Ex lap, peritoneal biopsy, partial omentectomy, resection of cul-de-sac nodularity, BILLIE BSO stage IV UPSC  8/29/2016:  49   9/13/2016-1/10/2017: 6 cycles carbo/ta

## 2019-02-10 NOTE — PROGRESS NOTES
[facility]    Nephrology Progress Note    Misti Lin Attending:  Pipe De Jesus,*       SUBJECTIVE:     States she is hungry but cannot eat because of ascites and abd pain associated with eating  Started wearing ace wraps on legs and h 02/09/2019    RBC 2.77 (L) 02/09/2019    HGB 7.9 (L) 02/09/2019    HCT 23.2 (L) 02/09/2019    .0 02/09/2019    MCV 83.8 02/09/2019    MCH 28.5 02/09/2019    MCHC 34.1 02/09/2019    RDW 21.1 (H) 02/09/2019    NEPRELIM 3.23 02/09/2019    NEPERCENT 64. 5-325 MG per tab 1 tablet 1 tablet Oral Q6H PRN   Or      HYDROcodone-acetaminophen (NORCO) 5-325 MG per tab 2 tablet 2 tablet Oral Q6H PRN       ASSESSMENT/PLAN:     72 yo F with history of metastatic endometrial cancer c/b ascites.  PE/DVT presented with

## 2019-02-11 PROBLEM — Z71.89 GOALS OF CARE, COUNSELING/DISCUSSION: Status: ACTIVE | Noted: 2019-01-01

## 2019-02-11 PROBLEM — Z51.5 PALLIATIVE CARE ENCOUNTER: Status: ACTIVE | Noted: 2019-01-01

## 2019-02-11 NOTE — PROCEDURES
[address] Patient Status:  Inpatient    1955 MRN [id number]   Location [address] [facility] Attending Nicolás Doan, [address] Day # 10 PCP Donny Calles MD         Brief Procedure Report    Pre-Operative

## 2019-02-11 NOTE — PAYOR COMM NOTE
--------------  CONTINUED STAY REVIEW    Payor: [address] #:  [id number]  Authorization Number:  [id number]       2/9:    HOSPITALIST:  Discussed with her primary Oncologist Dr. Brittnee Pittman on 2/8.  He will try to be in this 02/06/2019     AGRATIO 1.4 04/15/2011      02/09/2019     K 3.2 (L) 02/09/2019      02/09/2019     CO2 26.0 02/09/2019            Lab Results   Component Value Date     WBC 5.0 02/09/2019     RBC 2.77 (L) 02/09/2019     HGB 7.9 (L) 02/09/2019 hypernatremia  -- Kphos 500 mg BID x 2 doses  -- encouraged to increase PO intake  -- antiemetics as needed  -- wrap legs with ace bandages, patient is refusion compression stockings  -- paracentesis/thoracentesis prn    2/10:    Hospitalist:    Derrick conner

## 2019-02-11 NOTE — PROCEDURES
[facility]  Pre-Procedure Note    Name: Cassie Henson  MRN#: [id number]  : 1955    Procedure:  Ultrasound Guided Tunneled Peritoneal Pleurx Catheter Placement    Indication: Malignant Ascites.   Uterine Cancer    Allergies:    No Known Aller

## 2019-02-11 NOTE — CM/SW NOTE
SW sent updated clinicals to [facility]. The [facility] confirmed they will have a bed available for the pt tomorrow.

## 2019-02-11 NOTE — CONSULTS
[address] [facility] Patient Status:  Inpatient    1955 MRN [id number]   Location [address] [facility] Attending Preeti Darling, [address] [facility] Day # 10 PCP Taylor Oh MD     Date of Co has been relieved. She had used Miralax although did not like the taste, Senna S 1 tab twice daily and MOM as needed. She reviewed her previous treatment schedule with missing some treatments and the long wait when she does arrive for chemotherapy.  At Procedure Laterality Date   • COLONOSCOPY  2007    nl per pt   • HYSTEROSCOPY  2001 or 2004    unsure of date   • OTHER SURGICAL HISTORY  4 2010    two teeth pulled   • OTHER SURGICAL HISTORY  1999    LEFT CYST REMOVED FROM OVARY laporoscopy   • PORT REM visits according to symptoms (more robust than palliative care)    Palliative Care Services:  1) Usually visit once per 2-3 weeks  2) Perform GOC discussions  3) Follow up on symptom management      We discussed current clinical condition and overall progn 8.6 mg, Oral, BID  •  ondansetron HCl (ZOFRAN) injection 4 mg, 4 mg, Intravenous, Q6H PRN  •  Pantoprazole Sodium (PROTONIX) EC tab 40 mg, 40 mg, Oral, QAM AC  •  Alum & Mag Hydroxide-Simeth (MAALOX) oral suspension 30 mL, 30 mL, Oral, QID PRN  •  HYDROcod sounds X 4 quadrants, no rebound or guarding  Extremities: bilateral lower extremities edema, ace wrap in place bilateral.   Neurologic: Alert and oriented to person, place and time, soft affect. Psychiatric: Mood pleasant  Skin: Warm and dry.     Buzz Pummel Gabby Henson, APRN  Palliative Care  2/11/2019. 5:34 PM  [phone]

## 2019-02-11 NOTE — DIETARY NOTE
[facility]    NUTRITION FOLLOW-UP ASSESSMENT    Pt meets severe malnutrition criteria.     CRITERIA FOR MALNUTRITION DIAGNOSIS:  Criteria for severe malnutrition diagnosis: chronic illness related to wt loss greater than 10% in 6 months, energy intake meals and supplements as pt able. Small frequent meals and snacks. 2/6-Patient reports a good appetite and intake.  Patient ordered omelette and turkey sausage for breakfast. Ensure Compact is tolerated, patient prefers this supplement because there is exam.    NUTRITION PRESCRIPTION based on IBW 50 kg:  Calories: 4522-5047 calories/day (30-35 calories per kg)  Protein:  grams protein/day (1.5-2 grams protein per kg)  Fluid: ~1 ml/kcal or per MD discretion    MONITOR AND EVALUATE/NUTRITION GOALS:

## 2019-02-11 NOTE — PROGRESS NOTES
[facility] Hospitalist Progress Note                                                                   [facility] [facility] ROXY [address]  1/21/1955    CC: FU    Interval History:  Some abdominal pain, breathing mo admit  - PO intake as tolerated- nutrition in poor- being seen by dietitian in house     # Abd distention 2/2 ascites: sp paracentesis 2/4/19- much improved  - Abd very distended again  - Dr. Armando Tavarez discussed with her primary oncologist Dr. Bryant Kee at [address] [place] [address]

## 2019-02-11 NOTE — PHYSICAL THERAPY NOTE
Attempted on 2/11/19. Pt refused therapy, after d/w pt PT is not part of her current goals. RN aware to re-order if pt expresses willingness to participate in therapy. Will sign off for PT d/t multiple refusals.

## 2019-02-11 NOTE — PROGRESS NOTES
Pulmonary Progress Note        NAME: Bonilla Jones - ROOM: [address] [place] [address]-A - MRN: [id number] - Age: 71year old - : 1955        Last 24hrs: No events overnight    OBJECTIVE:   02/10/19  0415 02/10/19  1150 02/10/19  1915 02/11/19  0415   BP: 106/53 92 0.90 - 1.10 Final     TSH   Date/Time Value Ref Range Status   04/15/2011 08:20 AM 3.150 0.450 - 4.500 uIU/mL Final   02/02/2008 11:28 AM 2.121 0.350 - 5.500 uIU/mL Final     Albumin   Date/Time Value Ref Range Status   02/11/2019 06:46 AM 2.2 (L) 3.1 - 4.

## 2019-02-11 NOTE — PROGRESS NOTES
NPO since midnight for pluerex catheter insertion today. Continues on Heparin GTT,adjustments made per protocol, now at 6.5 cc/hr. Gtt to be turned off at 0400 for procedure. Pt denies pain this shift, no signs of distress, continue to monitor.

## 2019-02-12 NOTE — HOME CARE LIAISON
Will submit Palliative referral for APN to follow at the [facility]- thanks

## 2019-02-12 NOTE — PROGRESS NOTES
[address] Patient Status:  Inpatient    1955 MRN [id number]   [facility] 4NW-A Attending Beverly Silva, DO   Hosp Day # 6 PCP Stone Keith MD     SUBJECTIVE: Pt s/p IR peritoneal pleurX catheter yesterday. BS.                          Extremity: No clubbing or cyanosis. + edema                          Skin: No rashes or lesions.         Lab Results   Component Value Date     02/12/2019    K 3.9 02/12/2019     02/12/2019    CO2 27.0 02/12/2019

## 2019-02-12 NOTE — PROGRESS NOTES
[address] Patient Status:  Inpatient    1955 MRN [id number]   [facility] 4NW-A Attending Shalini Conway, [address] [facility] Day # 6 PCP Julio Cesar Kohli MD     Date of Consult: 2019  Patrick Borrego INR 1.37 (H) 02/10/2019    PTT 37.4 (H) 02/11/2019       Chemistry:  Lab Results   Component Value Date    CREATSERUM 0.28 (L) 02/12/2019    BUN 7 (L) 02/12/2019     (H) 02/12/2019    K 3.9 02/12/2019     02/12/2019    CO2 27.0 02/12/2019 Patient's preference about sharing medical information: Family, Jeremy Fent her sister is the [employer]  Patient's decision making preferences: self  Code status: FULL CODE. Code status was discussed at length. HCPOA aware of patient's wishes.    Have advanced dir examination, and >50% was spent counseling and coordinating care.     YUSUF Leal  Palliative Care  2/12/2019, 12:43 PM  [phone]

## 2019-02-12 NOTE — OCCUPATIONAL THERAPY NOTE
Attempted to see pt this PM, pt refusing therapy stating she does not want to get up, pt educated on OOB activities and still not willing, due to 4 refusals, OT will D/C patient, please re-order if pt is willing to participate.

## 2019-02-12 NOTE — PROGRESS NOTES
NURSING DISCHARGE NOTE    Discharged Rehab facility via Wheelchair. Accompanied by Support staff  Belongings Taken by patient/family.

## 2019-02-12 NOTE — DISCHARGE SUMMARY
General Medicine Discharge Summary     Patient ID:  Abram Ramírez  71year old  1/21/1955    Admit date: 2/1/2019    Discharge date and time: 2/12/19    Attending Physician: Gertrudis Simmons DO 2/4/19- much improved  - Abd very distended again  - Dr. Lisa Carrero discussed with her primary oncologist Dr. Odin Lewis at [facility] and patient- given recurrent ascites and logistical challenges/risks regarding holding her [facility] for recurrent procedures pleurex may be daily with food. Omeprazole 40 MG Oral Capsule Delayed Release  Take 1 capsule (40 mg total) by mouth daily. Before meal    Ondansetron HCl (ZOFRAN) 4 mg tablet  Take 4 mg by mouth every 8 (eight) hours as needed for Nausea.           Activity: activity

## 2019-02-12 NOTE — PLAN OF CARE
Pt will be discharged to [facility] today. Pt has some pain and norco given for pain with good relief.

## 2019-02-12 NOTE — CM/SW NOTE
02/12/19 1400   Discharge disposition   Expected discharge disposition Skilled Nurs   Name of [address] [facility]   Discharge transportation [address] [place] [address] to arrive at [facility] is aware the pt will dc at 5:00pm. S

## 2019-02-13 NOTE — CM/SW NOTE
Received call from [employer] looking for yellow folder containing PeurX catherter care/drainage. Staff unable to locate packet. Ivonne (clinical leader) obtaining 2 drain kits w/ instructions to give [employer].      CM called Dr Mitul Montejo order Bertram Kaiser

## 2019-02-13 NOTE — PROGRESS NOTES
Candice Mireles  : 1955  Age 71year old  female patient is admitted to Facility: The [address] [place] [address] for Rehabilitation and Medical Management.     [address] [place] [address] Drive date:  19  Discharge date to [facility]:  19  ELOS:  14 days  A December of 2018. She has not been able to regain strength since then. Requested the Palliative Care Consult. She did have some serosanguinous drainage coming from the pleurex.   RN manager called Dr. Amparo Gomez office, and obtained orders for pleurex colton every 6 (six) hours as needed for Pain. 1-2 tabs Q6 PRN Disp: 30 tablet Rfl: 0   Rivaroxaban (XARELTO) 20 MG Oral Tab Take 20 mg by mouth daily with food.  Disp:  Rfl:    Ondansetron HCl (ZOFRAN) 4 mg tablet Take 4 mg by mouth every 8 (eight) hours as neede membranes pink, moist, pharynx no exudate, no visible cerumen.   NECK: supple; FROM; no JVD, no TMG, no carotid bruits  BREAST: ---  RESPIRATORY:---Diminished meli  CARDIOVASCULAR: S1, S2 normal, RRR; no S3, no S4; , no click, no murmur  ABDOMEN:  no organom Albumin Latest Ref Range: 3.4 - 5.0 g/dL 1.7 (L)   WBC Latest Ref Range: 4.0 - 11.0 x10(3) uL 8.7   Hemoglobin Latest Ref Range: 12.0 - 16.0 g/dL 8.5 (L)   Hematocrit Latest Ref Range: 35.0 - 48.0 % 26.2 (L)   Platelet Count Latest Ref Range: 150.0 - 450 labs    Ascites, malignant/Abd distention 2/2 ascites/Metastatic Uterine Cancer  -Pleurx catheter in place  -Slight drainage noted, f/u'd with Dr. Lilliam Kimbrough  -Pleurx drainage qod, per Dr. Geri Andersen, per Dr. Claudene Shuck    Decreased UOP  -Monitor urine

## 2019-02-16 PROBLEM — R79.89 AZOTEMIA: Status: ACTIVE | Noted: 2019-01-01

## 2019-02-16 PROBLEM — J94.8 HYDROPNEUMOTHORAX: Status: ACTIVE | Noted: 2019-01-01

## 2019-02-16 NOTE — CONSULTS
Pulmonary H&P/Consult     NAME: Aaron Tompkins - ROOM: [facility] - MRN: [id number] - Age: 71year old - :  1955    Date of Admission: 2019  2:36 PM  Admission Diagnosis: No admission diagnoses are documented for this encounter.     Assessment/Radha 2010    two teeth pulled   • OTHER SURGICAL HISTORY  1999    LEFT CYST REMOVED FROM OVARY laporoscopy   • PORT REMOVAL Right 01/2017   • REMOVAL OF OVARIAN CYST(S) Left     2001     Family History   Problem Relation Age of Onset   • Heart Disorder Mother the last 168 hours. Recent Labs   Lab  02/16/19   1510   TROP  <0.045     Imaging: I independently visualized all relevant chest imaging in PACS, agree with radiology interpretation except where noted.

## 2019-02-16 NOTE — PROGRESS NOTES
Candice Mireles, 1/21/1955, 71year old, female    Chief Complaint:  Patient presents with:   Follow - Up: Weakness, generalized     Subjective:  70 y/o with PMHx significant for metastatic uterine cancer, PE, DVT, pancytopenia, and diverticulosis present Temp 97.6 °F (36.4 °C)   Resp 20   Wt 188 lb 11.2 oz   SpO2 93%   BMI 34.51 kg/m²     PHYSICAL EXAM:  GENERAL HEALTH: Appears weak and fatigued; loss of appetite  LINES, TUBES, DRAINS:  Pleurex catheter - location right lateral aspect of back  SKIN: pale, 374.0 02/13/2019       Assessment and plan:  Weakness generalized/Poor appetite 2/2 chemotherapy  -PT/OT to eval and treat  -Tylenol 650 mg q6h prn for fever/pain, if given for fever, notify MD/NP  -Norco 5/325 1-2 tabs q6h prn  -Dietary consult  -SLP cons

## 2019-02-16 NOTE — ED INITIAL ASSESSMENT (HPI)
Pt c/o shortness of breath since 0200 today; pt has paracentesis yesterday; r/o pneumothorax on L.  Pt c/o difficulty taking a deep breath

## 2019-02-16 NOTE — ED PROVIDER NOTES
Patient Seen in: [facility] Emergency Department    History   Patient presents with:  Dyspnea LANETTE SOB (respiratory)    Stated Complaint: SOB    HPI    Patient is a 41-year-old female with a history of metastatic uterine cancer.   Patient currently is HPI.  Constitutional and vital signs reviewed. All other systems reviewed and negative except as noted above.     Physical Exam     ED Triage Vitals [02/16/19 1439]   /77   Pulse (!) 125   Resp 24   Temp 97.8 °F (36.6 °C)   Temp src Temporal   Sp components:    Pro-Beta Natriuretic Peptide 260 (*)     All other components within normal limits   CBC W/ DIFFERENTIAL - Abnormal; Notable for the following components:    RBC 3.49 (*)     HGB 9.8 (*)     HCT 28.7 (*)     .0 (*)     RDW 20.4 (*) diagnosis)  Endometrial cancer [facility])    Disposition:  Admit  2/16/2019  5:03 pm    Follow-up:  No follow-up provider specified.       Medications Prescribed:  Current Discharge Medication List        Present on Admission  Date Reviewed: 2/16/2019          I

## 2019-02-17 NOTE — IMAGING NOTE
70 yo F L chest tube placement for L hydropneumothorax. No PTX seen on this AM's XR. Management of tube per pulm. Anticipate trial of clamping tomorrow.

## 2019-02-17 NOTE — ED NOTES
Pt taken to CT for chest tube placement with PCT, RN and monitor. Report given to [facility]. Pt will go directly to 430 following procedure. Family escorted to pts room by PCT to wait for pt.

## 2019-02-17 NOTE — PROCEDURES
[facility]  Procedure Note    [place] Patient Status:  Emergency    1955 MRN [id number]   Location [address] [facility] Attending Jaelyn Campos MD   Hosp Day # 0 PCP Kiran Aguiar MD     Procedure: CT guided left chest tu

## 2019-02-17 NOTE — PLAN OF CARE
RESPIRATORY - ADULT    • Achieves optimal ventilation and oxygenation Progressing        Pt brought to unit by ER nurse at change of shift and left in room. Pt with chest tube to 20 cm wall suction. serous drainage noted in the tubing.  Dressing to chest tu

## 2019-02-17 NOTE — ED NOTES
This RN to CT with pt for guided chest tube placement performed by Dr. Joaquin Banda.  Pt remains on cardiac monitor and pulse oximeter per MD.

## 2019-02-17 NOTE — PROGRESS NOTES
Pulmonary Progress Note      NAME: Liberty Singleton - ROOM: 430/430-A - MRN: [id number] - Age: 71year old - : 1955    Assessment/Plan:  1.  Left hydropneumothorax - effusion is malignant  - chest tube placed  by IR  - no evidence of pneumothora GFRAA  142  142  139  125   GFRNAA  123  123  121  109   CA  8.3  8.7  8.8  9.1   ALB  1.7*  1.7*  1.7*  1.8*   --    NA  145*  146*  141  144   K  3.9  4.0  3.6  4.2   CL  111  110*  105  108*   CO2  27.0  27.0  27.0  26.0   ALKPHO  74  90  119   --

## 2019-02-17 NOTE — PLAN OF CARE
NURSING ADMISSION NOTE      Patient admitted via Cart  Oriented to room. Safety precautions initiated. Bed in low position. Call light in reach. Per Maryuri in ER, pt to go for CT guided chest tube placement prior to pt coming to rm 430.  Per Maryuri in E

## 2019-02-17 NOTE — PHYSICAL THERAPY NOTE
PHYSICAL THERAPY EVALUATION - INPATIENT     Room Number: 430/430-A  Evaluation Date: 2/17/2019  Type of Evaluation: Initial  Physician Order: PT Eval and Treat    Presenting Problem: shortness of breath, metastatic uterine cancer  Reason for Therapy: transfers from wheelchair at rehab, needed assistance for transfers    SUBJECTIVE  'Last night was a terrible night.'    Patient self-stated goal is move better. OBJECTIVE  Precautions: Chest tube; Other (Comment)(port)  Fall Risk: High fall risk    SIXTO of PT.  Bed mobility: supine to sit with min A, with head of bed raised. Sit to supine with mod A, dependent to scoot up in bed.   Transfer training: sit to stand performed with min A and RW, height of bed raised, cues for forward weight shift, hand placem training;Gait training;Patient education; Family education;Strengthening  Rehab Potential : Fair  Frequency (Obs): 5x/week  Number of Visits to Meet Established Goals: 5      CURRENT GOALS    Goal #1 Patient is able to demonstrate supine - sit EOB @ level:

## 2019-02-17 NOTE — PROGRESS NOTES
[facility] Hospitalist Progress Note                                                                   [address]  1/21/1955    SUBJECTIVE: breathing much improved. Minimal chest pain.  No ne PF    Assessment/Plan:  Principal Problem:    Hydropneumothorax  Active Problems:    Endometrial cancer ([address]    Azotemia     70 yo with mmp including but not limited to metastatic uterine cancer with malignant ascites s/p pleurx and malignant L  pleural e

## 2019-02-17 NOTE — H&P
[facility] Hospitalist H&P       CC: sob    PCP: Barb Narvaez MD    History of Present Illness: Pt is a 70 yo with mmp including but not limited to metastatic uterine cancer with malignant ascites s/p pleurx and malignant L  pleural effusion s/p thoracentes HYDROcodone-acetaminophen 5-325 MG Oral Tab Take 1 tablet by mouth every 6 (six) hours as needed for Pain. 1-2 tabs Q6 PRN Disp: 30 tablet Rfl: 0   Rivaroxaban (XARELTO) 20 MG Oral Tab Take 20 mg by mouth daily with food.  Disp:  Rfl:    Ondansetron HCl ( 9.8*   MCV   --   85.1  82.2   PLT   --   374.0  461.0*   INR  1.37*   --   1.55*       Recent Labs   Lab  02/10/19   0605  02/11/19   0646  02/12/19   0512  02/13/19   1109  02/16/19   1510   NA  145*  146*  145*  146*  141   K  3.2*  4.0  3.9  4.0  3.6 placed    **GERD-no acute issues, monitor    **chronic anemia-stable    **Mild hypotension and tachycardia- monitor closely    **PPx-scds, xarelto on hold given recent procedure    Outpatient records or previous hospital records reviewed.      Further recom

## 2019-02-18 NOTE — PROGRESS NOTES
[address] Patient Status:  Inpatient    1955 MRN [id number]   [facility] 4NW-A Attending Abimbola Sifuentes, DO   Hosp Day # 2 PCP Danilo Diaz MD     SUBJECTIVE: Pt denies complaints today.   Has some soreness a                         Chest wall: left chest tube in place, +air leak with cough noted                           Heart: Regular rate and rhythm, normal S1S2                          Abdomen: soft, non-tender, non-distended, positive BS.                  endometrial cancer  - per onc  5.  Dispo - full code  - will follow    Jenna Manuel MD  2/18/2019  11:46 AM

## 2019-02-18 NOTE — PAYOR COMM NOTE
[facility]  ADMISSION REVIEW     Payor: [address] [place] [address] #:  [id number]  Authorization Number: N/A      ED Provider Notes        Patient Seen in: [facility] Emergency Department    History   Patient presents with:  Dyspnea D Reviewed   COMP METABOLIC PANEL (14) - Abnormal; Notable for the following components:       Result Value    Creatinine 0.30 (*)     BUN/CREA Ratio 46.7 (*)     ALT 10 (*)     Total Protein 5.4 (*)     Albumin 1.8 (*)     A/G Ratio 0.5 (*)     All other co discussed with Dr. Francia Shane at 26 [id number] hr on 2/16/2019. Read back was performed. MDM   Patient's chest x-ray showed. To be pleural effusion large pneumothorax. I did speak with Dr. Samira Lopez from pulmonology who did come evaluate the patient.   Patient L hydropneumothorax s/p chest tube placement in IR  -management per pulmonary, appreciate  -IV morphine prn, norco prn.  Antiemetics, bowel regimen    **metastatic uterine cancer with malignant ascites s/p pleurx and malignant L  pleural effusion s/p thorac norco prn. Antiemetics, bowel regimen  -Repeat chest xr tomorrow and consider clamping     **metastatic uterine cancer with malignant ascites s/p pleurx and malignant L  pleural effusion s/p thoracentesis recently  -on palliative care.    -follow with Dr. Kiran Stahl

## 2019-02-18 NOTE — PROGRESS NOTES
[facility] Hospitalist Progress Note                                                                   [address]  1/21/1955    SUBJECTIVE: pain at times at pleurx. Breathing ok.  No new comp Hydropneumothorax  Active Problems:    Endometrial cancer (HCC)    Azotemia     72 yo with mmp including but not limited to metastatic uterine cancer with malignant ascites s/p pleurx and malignant L  pleural effusion s/p thoracentesis recently, hypercoagu

## 2019-02-18 NOTE — PLAN OF CARE
Pt is alert and oriented x 4. Vitals stable. Remains on Room air. Chest tube to suction to 20 cm H2O. Upon deep breathing no air leak noted but when pt coughs there is air leak on the chest tube chamber noted.  Pt had some pain in the abdomen and on the ethan

## 2019-02-18 NOTE — DIETARY MALNUTRITION NOTE
[facility]    NUTRITION INITIAL ASSESSMENT    Pt meets severe malnutrition criteria.     CRITERIA FOR MALNUTRITION DIAGNOSIS:  Criteria for severe malnutrition diagnosis: chronic illness related to wt loss greater than 10% in 6 months, energy intake le Gregg Wt: 107kg    WEIGHT HISTORY:   Wt Readings from Last 10 Encounters:  02/16/19 : 85 kg (187 lb 6.3 oz)  02/15/19 : 85.6 kg (188 lb 11.2 oz)  02/01/19 : 84.8 kg (187 lb)  01/04/19 : 83 kg (183 lb)  11/27/18 : 94.8 kg (209 lb)  11/23/18 : 94.8 kg (209 lb)  10

## 2019-02-18 NOTE — CM/SW NOTE
Pt admitted 2/16/19 with hydropneumothorax. Chart reviewed. Pt noted to have been admitted from [facility]. Per rounds, pt \" happy at [facility]. \" Referral/clinical updates sent via [facility] for anticipated return. QD completed.  SW will follow for anticipa

## 2019-02-18 NOTE — PLAN OF CARE
Impaired Functional Mobility    • Achieve highest/safest level of mobility/gait Progressing        RESPIRATORY - ADULT    • Achieves optimal ventilation and oxygenation Progressing        Received patient dozing, awakened easily when her name was called.  O

## 2019-02-18 NOTE — PROGRESS NOTES
Name:Romelia Johnson  ANDREA#:[id number]  :1955      Subjective:  Some chest tightness. Itchy pleurx site. Objective:  Pleurx drain site is clean, dry and intact. Itching at suture site. Left chest tube site is intact. No ptx on todays CXR.

## 2019-02-18 NOTE — PHYSICAL THERAPY NOTE
PHYSICAL THERAPY TREATMENT NOTE - INPATIENT    Room Number: 430/430-A     Session: 1  Number of Visits to Meet Established Goals: 5    Presenting Problem: shortness of breath, metastatic uterine cancer    History related to current admission: pt was admit RESTRICTION                   PAIN ASSESSMENT   Rating: Unable to rate  Location: L lower abdomen/flank   Management Techniques:  Activity promotion;Breathing techniques;Repositioning    BALANCE following of directions and technique. Pt did exhibit fatigue at second to last exercise, requiring 2 rest breaks. Returned back to bed, sit>sup with Mod A at B LE.  Assist of 2 to scoot toward [facility] and get positioned into semi L S/L via sheet and Trende

## 2019-02-19 NOTE — PLAN OF CARE
GASTROINTESTINAL - ADULT    • Maintains adequate nutritional intake (undernourished) Progressing        HEMATOLOGIC - ADULT    • Maintains hematologic stability Progressing        Impaired Functional Mobility    • Achieve highest/safest level of mobility/g

## 2019-02-19 NOTE — PLAN OF CARE
Impaired Functional Mobility    • Achieve highest/safest level of mobility/gait Progressing          RESPIRATORY - ADULT    • Achieves optimal ventilation and oxygenation Progressing          Pt. A&O x4. VSS. Afebrile. No c/o SOB overnight.  Pt. C/o pain at

## 2019-02-19 NOTE — PROGRESS NOTES
[address] [facility] Patient Status:  Inpatient    1955 MRN [id number]   [facility] 4NW-A Attending Rosanna Zamora, [address] [facility] Day # 3 PCP Mitch Rosales MD     SUBJECTIVE:no new events. Feels better.      OBJECTIVE:  BP ( INR 1.55 (H) 02/16/2019    INR 1.37 (H) 02/10/2019          Imaging: CXR: 2/18/19 personally reviewed. L CT in place. No PTX. Layering L effusion, patchy infiltrate in that area     ASSESSMENT/PLAN:  1.  Left hydropneumothorax - effusion is malignant  -

## 2019-02-19 NOTE — PROGRESS NOTES
[facility]  Progress Note      Early Bonine Patient Status:  Inpatient    1955 MRN [id number]   [facility] 4NW-A Attending Joe Nielsen, DO   Hosp Day # 3 PCP Janice Galarza MD       71year-old female with left hydropneumot

## 2019-02-19 NOTE — CM/SW NOTE
02/19/19 1100   CM/SW Referral Data   Referral Source Social Work (self-referral)   Reason for Referral Discharge planning   Patient Info   Patient's Mental Status Alert;Oriented   Patient's [address] Name [place]

## 2019-02-19 NOTE — PHYSICAL THERAPY NOTE
PHYSICAL THERAPY TREATMENT NOTE - INPATIENT    Room Number: 430/430-A     Session: 2  Number of Visits to Meet Established Goals: 5    Presenting Problem: shortness of breath, metastatic uterine cancer    History related to current admission: pt was admit PAIN ASSESSMENT   Rating: 3  Location: L lower abdomen/flank   Management Techniques:  Activity promotion;Breathing techniques;Repositioning    BALANCE with good eccentric control to chair. Pt reluctant to advance gait distance this session - seated HEP as listed below. Pt also performed IS x 10 reps - able to achieve 500-100. Pt with call light in reach. RN informed of session.     Mobility Guide Goal #5     Goal #6     Goal Comments: Goals established on 2/17/2019 - all goals ongoing as of 2/19/2019

## 2019-02-19 NOTE — PLAN OF CARE
1230- attempted to drain right sided PleurX; sterile procedure preformed with scant output of yellow fluid. Vaibhav Velasquez with Dr. Maricruz Zhnog from IR; OK to restart Xarelto if/when CXR is clear and chest tube removed. Page out to Dr. Jayjay Hernandez for Vasiliy Hicks.

## 2019-02-19 NOTE — PROGRESS NOTES
[facility] Hospitalist Progress Note                                                                   [address]  1/21/1955    SUBJECTIVE: No cp or sob.  No new complaints    OBJECTIVE:  Tem metastatic uterine cancer with malignant ascites s/p pleurx and malignant L  pleural effusion s/p thoracentesis recently, hypercoaguable state with recent PE/DVT on xarelto, GERD is admitted for sob.      **sob secondary to L hydropneumothorax s/p chest tu

## 2019-02-20 NOTE — PLAN OF CARE
GASTROINTESTINAL - ADULT    • Maintains adequate nutritional intake (undernourished) Progressing          HEMATOLOGIC - ADULT    • Maintains hematologic stability Progressing          RESPIRATORY - ADULT    • Achieves optimal ventilation and oxygenation Pr

## 2019-02-20 NOTE — PROGRESS NOTES
Pulmonary Progress Note        NAME: Luisito Stone - ROOM: [facility]430-A - MRN: [id number] - Age: 71year old - : 1955        Last 24hrs: No events overnight, nervous about having chest tube removed    OBJECTIVE:   19  0606 19  1245  Date/Time Value Ref Range Status   02/16/2019 03:10 PM 1.8 (L) 3.4 - 5.0 g/dL Final   04/15/2011 08:20 AM 4.0 3.5 - 5.5 g/dL Final         Imaging: chest x-ray reviewed- no worsening pneumo w/ clamping of chest tube    ASSESSMENT/PLAN:  1.  Left hydropneu

## 2019-02-20 NOTE — PROGRESS NOTES
[facility] Hospitalist Progress Note                                                                   [address]  1/21/1955    SUBJECTIVE: No cp or sob. No new complaints.  Anxious about tub metastatic uterine cancer with malignant ascites s/p pleurx and malignant L  pleural effusion s/p thoracentesis recently, hypercoaguable state with recent PE/DVT on xarelto, GERD is admitted for sob.      **sob secondary to L hydropneumothorax s/p chest tu

## 2019-02-20 NOTE — PROGRESS NOTES
Name:Romelia Johnson  [facility]#:[id number]  :1955      Subjective:  No pain at chest tube or Pleurx sites. Objective: Both tube sites are clean, dry and intact.       Vital Signs:  Blood pressure (!) 83/48, pulse 105, temperature 98 °F (36.7 °C), te

## 2019-02-21 NOTE — PROGRESS NOTES
[facility] Hospitalist Progress Note     PCP: Benson Humphrey MD    SUBJECTIVE:  Pt had chest pain and dyspnea earlier. She is worried. She thinks she should be considering hospice. [place] better after 1L fluid drained from her abdomen.     OBJECTIVE:  Temp:  [97.7 • ALPRAZolam  0.25 mg Oral Once   • Pantoprazole Sodium  40 mg Oral QAM AC       morphINE sulfate (PF), acetaminophen, HYDROcodone-acetaminophen, Ondansetron HCl, Senna, PEG 3350, magnesium hydroxide, bisacodyl, FLEET ENEMA, Metoclopramide HCl, ondanse

## 2019-02-21 NOTE — CM/SW NOTE
BANG sent updated clinicals to [facility]. Checked to see if they have insurance auth. Informed them the pt will most likely dc today. Will follow up.

## 2019-02-21 NOTE — PROGRESS NOTES
[facility]  Progress Note      Jacqui Brown Patient Status:  Inpatient    1955 MRN [id number]   [facility] 4NW-A Attending Pierce Denver,    Hosp Day # 5 PCP David Zambrano MD       Subjective:   Resting in bed    Objective:

## 2019-02-21 NOTE — PHYSICAL THERAPY NOTE
Attempted to see pt this AM; however pt refuses IP PT services today, stating \"No, Kael Hendrix, I do not want PT today. \" Will f/u as schedule permits.  Ariel Mai, SPT 9:33 AM

## 2019-02-21 NOTE — PLAN OF CARE
A&Ox4. VSS; BP baseline runs lower; asymptomatic. CXR completed; orders to unclamp chest tube received from Dr. Abby Tucker. Output in about an hour was 150cc; called to pulmonary. Orders to keep chest tube unclamped and recheck CXR in the morning.  No leo

## 2019-02-21 NOTE — PROGRESS NOTES
Pulmonary Progress Note      NAME: Mariluz Osborne - ROOM: 430/[facility]-A - MRN: [id number] - Age: 71year old - : 1955    Assessment/Plan:  1.  Left hydropneumothorax - effusion is malignant  - chest tube placed  by IR  - no PTX on CXR with tube cl 0.30*  0.41*   GFRAA  139  125   GFRNAA  121  109   CA  8.8  9.1   ALB  1.8*   --    NA  141  144   K  3.6  4.2   CL  105  108*   CO2  27.0  26.0   ALKPHO  119   --    AST  15   --    ALT  10*   --    BILT  0.3   --    TP  5.4*   --      Imaging: I indepe

## 2019-02-22 NOTE — PROGRESS NOTES
[address] Patient Status:  Inpatient    1955 MRN [id number]   [facility] 4NW-A Attending Tyler Benoit, DO   Hosp Day # 6 PCP Anneliese Oseguera MD     SUBJECTIVE: Pt with chest pain overnight, troponin and EKG chec Exam:                          General: alert, cooperative, in NAD                          HEENT: oropharynx clear without erythema or exudates, moist mucous membranes                          Lungs: diminished BS bilaterally                           Ana hold for now, can resume when ok with IR  2. Malignant ascites  - s/p pleurx placement- drainage schedule per onc  3. Pulmonary embolism  - xarelto on hold - restart when IR OK's  4. Metastatic endometrial cancer  - per onc  5.  Dispo - full code  -will fol

## 2019-02-22 NOTE — PLAN OF CARE
Assumed care of pt 1930. Aox4. Received pt on 2L02. . CT to -20 suction, no air leak noted with cough. Dressing C/D/I. Yellow drainage noted in atrium container. Rt abdominal pleurx catheter dressing C/D/I. Generalized abdominal edema. +BS denies N/V.  A

## 2019-02-22 NOTE — OCCUPATIONAL THERAPY NOTE
Attempted to see pt for skilled OT services this date. Pt is currently off the floor for a chest tube exchange for a PleurX catheter. Will re-attempt tomorrow as schedule allows.  Sonny Conn, 02/22/19, 3:18 PM

## 2019-02-22 NOTE — DIETARY NOTE
[facility]    NUTRITION FOLLOW UP ASSESSMENT    Pt meets severe malnutrition criteria.     CRITERIA FOR MALNUTRITION DIAGNOSIS:  Criteria for severe malnutrition diagnosis: chronic illness related to wt loss greater than 10% in 6 months, energy intake placement in IR. RD added ensure compact per pt preference (does not like enlive). Encourage po intake of meals and supplement as pt able.   70 yo with mmp including but not limited to metastatic uterine cancer with malignant ascites s/p pleurx and malignan

## 2019-02-22 NOTE — PLAN OF CARE
GASTROINTESTINAL - ADULT    • Maintains adequate nutritional intake (undernourished) Progressing        No c/o nausea but poor appetite.  brought food from home. Some improvement.     HEMATOLOGIC - ADULT    • Maintains hematologic stability Progressi

## 2019-02-22 NOTE — PHYSICAL THERAPY NOTE
Attempted to see pt late this AM. Pt refuses services again today. When asked if she would like to continue receiving PT services pt states \"No. I am going home today. \" Will f/u as schedule permits.   Nayana Jang, 11:52 AM

## 2019-02-22 NOTE — PLAN OF CARE
RESPIRATORY - ADULT    • Achieves optimal ventilation and oxygenation Progressing          SAFETY ADULT - FALL    • Free from fall injury Progressing          HEMATOLOGIC - ADULT    • Maintains hematologic stability Progressing          Assumed care of pat

## 2019-02-22 NOTE — PROGRESS NOTES
[facility] Hospitalist Progress Note     PCP: Evelia Grewal MD    SUBJECTIVE:  No CP, SOB, or N/V. Pt now does not wish to discuss hospice care - would like info on this at time of dc though. Still has pleuritic pain with deep inspiration.     OBJECTIVE:  Temp: 02/21/19   1650  02/21/19   2303   TROP  <0.045  <0.045  <0.045  <0.045         Meds:     • ALPRAZolam  0.25 mg Oral Once   • Pantoprazole Sodium  40 mg Oral QAM AC       Heparin Lock Flush, morphINE sulfate (PF), acetaminophen, HYDROcodone-acetaminophen, level of care when she thinks it's appropriate. Questions/concerns were discussed with patient and/or family by bedside.     Total Time spent with patient and coordinating care:  25 minutes    Thank You,  Nessa Cheatham DO  [facility] Hospitalist  Pager: [phone]

## 2019-02-22 NOTE — PLAN OF CARE
GASTROINTESTINAL - ADULT    • Maintains adequate nutritional intake (undernourished) Progressing        Pt. W/ diminished appetite. Denies nausea. Abd soft, nontender.  Passing flatus    HEMATOLOGIC - ADULT    • Maintains hematologic stability Progressing

## 2019-02-23 NOTE — PROGRESS NOTES
Pulmonary Progress Note     Assessment / Plan:  1.  Left hydropneumothorax - effusion is malignant  - chest tube placed 2/16 by IR  - no PTX on CXR with tube clamped, chest tube maintained for increased output  - as CT output remains elevated would recommen

## 2019-02-23 NOTE — PROGRESS NOTES
[facility] Hospitalist Progress Note     PCP: Conchita Titus MD    SUBJECTIVE:  Pt laying in bed. Eating a little. Some pain. Breathing better. No cp/couhg/sputum/LH/dizziness. Drains in place. Some abd pain L side. Has been in bed all day.      OBJECTIVE:  Temp: <0.045  <0.045  <0.045         Meds:     • ALPRAZolam  0.25 mg Oral Once   • Pantoprazole Sodium  40 mg Oral QAM AC       Heparin Lock Flush, morphINE sulfate (PF), acetaminophen, HYDROcodone-acetaminophen, Ondansetron HCl, Senna, PEG 3350, magnesium hydr Hospitalist  [phone]  2/23/2019  5:38 PM

## 2019-02-23 NOTE — PLAN OF CARE
Impaired Functional Mobility    • Achieve highest/safest level of mobility/gait Not Progressing        MUSCULOSKELETAL - ADULT    • Return mobility to safest level of function Not Progressing          GASTROINTESTINAL - ADULT    • Maintains adequate nutrit

## 2019-02-23 NOTE — PLAN OF CARE
Impaired Functional Mobility    • Achieve highest/safest level of mobility/gait Not Progressing        MUSCULOSKELETAL - ADULT    • Return mobility to safest level of function Not Progressing        Very weak. Easily tired with activity.  Needs 1 person ass

## 2019-02-24 NOTE — PLAN OF CARE
GASTROINTESTINAL - ADULT    • Maintains adequate nutritional intake (undernourished) Not Progressing        HEMATOLOGIC - ADULT    • Maintains hematologic stability Not Progressing        Impaired Functional Mobility    • Achieve highest/safest level of mo

## 2019-02-24 NOTE — PROGRESS NOTES
Dressing changed to PAC,new needle  per Aleena;tolerated ;CT DRAINED TO 50 MLS AT THIS TIME FROM THIS AM;NO BLOODY FLUID NOTED;nORCO GIVEN FOR ABD PAIN;bp-109/67

## 2019-02-25 NOTE — OCCUPATIONAL THERAPY NOTE
OCCUPATIONAL THERAPY EVALUATION - INPATIENT     Room Number: 430  Evaluation Date: 2/25/2019  Type of Evaluation: Initial  Presenting Problem: hydropneumothorax    Physician Order: IP Consult to Occupational Therapy  Reason for Therapy: ADL/IADL Dysfunctio recent hospitalization. Pt also with 3 recent falls. Pt was recently at [place] [address], where she is working on chair transfers. Pt has been able to get dressed at [place] [address], but has been having help with bathing and toileting from nursing staff.     SUBJECTIVE   Pt states CK    FUNCTIONAL TRANSFER ASSESSMENT  Supine to Sit : Moderate assistance  Sit to Stand: Not tested    Skilled Therapy Provided: Pt seen semi supine. Supine to sit mod (A) with increased time. Supervision sitting balance EOB.  Assist needed to adjust socks - No comorbidities nor modifications of tasks    Clinical Decision Making LOW - Analysis of occupational profile, problem-focused assessments, limited treatment options    Overall Complexity LOW     OT Discharge Recommendations: Sub-acute rehabilitation(EL

## 2019-02-25 NOTE — PROGRESS NOTES
[facility] Hospitalist Progress Note     PCP: Conchita Titus MD    SUBJECTIVE:  Pt laying in bed. getting pain meds. Low b/p o/n, given albumin. Now 109/67. Eating a little, had pickles, n/v is issue.      OBJECTIVE:  Temp:  [97.9 °F (36.6 °C)-98.7 °F (37.1 °C)] 98 Pantoprazole Sodium  40 mg Oral QAM AC       Heparin Lock Flush, morphINE sulfate (PF), acetaminophen, HYDROcodone-acetaminophen, Ondansetron HCl, Senna, PEG 3350, magnesium hydroxide, bisacodyl, FLEET ENEMA, Metoclopramide HCl, ondansetron HCl, influenza patient by bedside.  D/w RN MD Des Sheikh  [phone]  2/24/2019  6:39 PM

## 2019-02-25 NOTE — PHYSICAL THERAPY NOTE
PHYSICAL THERAPY TREATMENT NOTE - INPATIENT    Room Number: 430/430-A     Session: 4   Number of Visits to Meet Established Goals: 5    History related to current admission: pt was admitted with shortness of breath, h/o metastatic uterine cancer with Judith Burch (Comment)(port)    WEIGHT BEARING RESTRICTION                   PAIN ASSESSMENT   Ratin  Location: L lower abdomen/flank   Management Techniques:  Activity promotion;Repositioning;Breathing techniques    BALANCE wanting to work on more sit to stands than just getting to chair as feels uncomfortable with inability to get out of the chair. Pt agreeable to therapy returning and continuing to work on UE/LE exercises was well as continued standing.     THERAPEUTIC EXER

## 2019-02-25 NOTE — TELEPHONE ENCOUNTER
Call received from Antoni Gregory, patient's sister and POA. She is looking for hemalatha Bhandari met with them regarding goals last admission.   She would like to speak with palliative care again this admission to make sure goals haven't changed and to make sure he

## 2019-02-25 NOTE — PROCEDURES
[facility]  Procedure Note    Luisito Stone Patient Status:  Inpatient    1955 MRN [id number]   [facility] Gary [facility]   [facility] Day # 9 PCP Donny Calles MD     Procedure:  Tunneled pleur

## 2019-02-25 NOTE — CM/SW NOTE
Call from Vi with [facility] [phone] re: d/c for pt. SW noted that pt is not medically cleared for d/c. She notes that facility does have pre-auth from [facility], so can accept when medically cleared for d/c.   She notes that beds are tight but facility w

## 2019-02-25 NOTE — PLAN OF CARE
Impaired Functional Mobility    • Achieve highest/safest level of mobility/gait Not Progressing          GASTROINTESTINAL - ADULT    • Maintains adequate nutritional intake (undernourished) Progressing        RESPIRATORY - ADULT    • Achieves optimal venti

## 2019-02-25 NOTE — PROGRESS NOTES
Pulmonary Progress Note        NAME: Serene Maddnence - ROOM: [facility] IVS/ IVS Estrellita Mayberry - MRN: [id number] - Age: 71year old - : 1955        Last 24hrs: went for pleur-x today but no appreciable fluid was noted in the pleural space, examination of pleurev input(s): TROPI    INR   Date/Time Value Ref Range Status   02/17/2019 05:14 AM 1.36 (H) 0.90 - 1.10 Final     TSH   Date/Time Value Ref Range Status   04/15/2011 08:20 AM 3.150 0.450 - 4.500 uIU/mL Final   02/02/2008 11:28 AM 2.121 0.350 - 5.500 uIU/mL Fi

## 2019-02-26 NOTE — PROGRESS NOTES
Returned from [facility]. Unable to do procedure d/t not enough fluid. CT clamped upon return to floor and has remained clamped throughout shift w/o c/o increased L chest pain or SOB. Output from CT prior to clamping 270 cc.   R abd pleurex catheter drained with 60

## 2019-02-26 NOTE — PHYSICAL THERAPY NOTE
Attempted to see pt this AM. Pt agreeable to therapy but trying to finish personal care before procedure scheduled at 10. Re-attempted but pt occupied by MD in room. Will reattempt later as appropriate and as schedule permits.

## 2019-02-26 NOTE — PLAN OF CARE
CXR result reviewed by Dr Kwok Dec & placed order for exchange chest tube with plurx cath. IR notified,plan is for 10am procedure. Patient made aware of plan. NPO maintained. All needs attended. Assisted in turning & repositioning in bed. With episodes of urinary i

## 2019-02-26 NOTE — PROGRESS NOTES
[facility] Hospitalist Progress Note     PCP: Charlette Nunez MD    SUBJECTIVE:  Pt laying in bed. Nausea is better as well as pain. Eating. LUE swelling    SBP 80-100s.      OBJECTIVE:  Temp:  [97.4 °F (36.3 °C)-98.1 °F (36.7 °C)] 97.4 °F (36.3 °C)  Pulse:  [92-98] ondansetron HCl  4 mg Intravenous Q6H   • ALPRAZolam  0.25 mg Oral Once   • Pantoprazole Sodium  40 mg Oral QAM AC     • Continuous dose Heparin infusion       Prochlorperazine Maleate, Heparin Lock Flush, morphINE sulfate (PF), acetaminophen, HYDROcodone- considering hospice care but overwhelmed and now she states that she is not ready for that.  palliative to transition her to that level of care when she thinks it's appropriate. Questions/concerns were discussed with patient by bedside.  D/w MARYAM Albarran

## 2019-02-26 NOTE — DIETARY NOTE
[facility]    NUTRITION FOLLOW UP ASSESSMENT    Pt meets severe malnutrition criteria.     CRITERIA FOR MALNUTRITION DIAGNOSIS:  Criteria for severe malnutrition diagnosis: chronic illness related to wt loss greater than 10% in 6 months, energy intake ensure compact BID, encourage po intake of meals and supplements as pt able. 2/18-consult for wt loss, poor po intake. Pt has lost 49 lbs in past 6 months due to decreased po intake. Pt known from previous recent admission.  Admitted with SOB secondary to prescription  2. At least 75% intake of oral supplements  3. No signs of skin breakdown  4.  Maintain lean body mass    MEDICATIONS:  Noted    LABS:  Noted    Pt is at high nutrition risk    FOLLOW-UP DATE: 3/1/19    Jesus Kelley MA, RD, LDN  Pager [phone]

## 2019-02-26 NOTE — PROCEDURES
1.  Lt Pleurx catheter placed. 2.  Removal of 600 ml thin slightly yellow colored pleural fluid. 3.  Removal of lt pleural pig tail catheter. Comp-none.

## 2019-02-26 NOTE — PROGRESS NOTES
[facility] Hospitalist Progress Note     PCP: Carson Kelly MD    SUBJECTIVE:  Pt laying in bed. Reports cp, L sided. No abd pain.    SBP 90s    OBJECTIVE:  Temp:  [97.5 °F (36.4 °C)-98 °F (36.7 °C)] 97.7 °F (36.5 °C)  Pulse:  [101-122] 114  Resp:  [18] 18  BP: (8 <0.045  <0.045         Meds:     • ondansetron HCl  4 mg Intravenous Q6H   • ALPRAZolam  0.25 mg Oral Once   • Pantoprazole Sodium  40 mg Oral QAM AC     • Continuous dose Heparin infusion 1,000 Units/hr (02/26/19 6164)     Prochlorperazine Maleate, Hepari baseline    FEN/malnutrition  -encourage PO intake as able  -reports nausea is main issue, has appeitie; scheduled zofran now c improvement, cont prn reglan, add compazine    **PPx-scds, resume xarelto when able     DISPO: DC planning - [place] to [place] - o

## 2019-02-26 NOTE — PLAN OF CARE
GASTROINTESTINAL - ADULT    • Maintains adequate nutritional intake (undernourished) Not Progressing          RESPIRATORY - ADULT    • Achieves optimal ventilation and oxygenation Progressing        SAFETY ADULT - FALL    • Free from fall injury Progressin

## 2019-02-26 NOTE — PLAN OF CARE
Patient complaints of pain to the upper L side of the chest,right where the pleurx was placed radiating to her back. She claims she felt it when she was having her procedure with IR with the chest tube exchange with pleurx. Has requested morphine IV dose g

## 2019-02-26 NOTE — PLAN OF CARE
EKG with no significant changes from previous & & troponin with normal result relayed to Dr Amy Yoon. No new orders received. MD instructed RN to continue to give oral pain medication for c/o pain & try to hold off on IV morphine since patient's BP has been

## 2019-02-26 NOTE — PROGRESS NOTES
[address] Patient Status:  Inpatient    1955 MRN [id number]   [facility] 4NW-A Attending Tad Rodarte MD   Hosp Day # 10 PCP Conchita Titus MD     SUBJECTIVE: no new events.   Feels about the same     OBJ 65 years and older inj 0.5ml, 0.5 mL, Intramuscular, Prior to discharge     General appearance: alert, appears stated age and cooperative  Lungs: clear to auscultation bilaterally  Heart: S1, S2 normal, no murmur, click, rub or gallop, regular rate and rhy

## 2019-02-26 NOTE — PLAN OF CARE
Back to scooby s/p L pleurx cath placement & removal of chest tube. Awake & alert,complained of pain on the lower back,norco 2tabs given. No SOB,SPO2 97 on RA. Maintained on bed rest.Stable vital signs. Will continue to monitor.

## 2019-02-27 NOTE — PROGRESS NOTES
Pulmonary Progress Note        NAME: Nicolas Campbell - ROOM: 430/430-A - MRN: [id number] - Age: 71year old - : 1955        Last 24hrs: No events overnight, noted some chest pain w/ the catheter exchange yesterday but it is improving, no trouble w DBIL, TPROT    Invalid input(s): ARTERIALPH, ARTERIALPO2, ARTERIALPCO2, ARTERIALHCO3    No results for input(s): BNP in the last 72 hours.     Invalid input(s): TROPI    INR   Date/Time Value Ref Range Status   02/17/2019 05:14 AM 1.36 (H) 0.90 - 1.10 Final

## 2019-02-27 NOTE — PLAN OF CARE
Patient was approached multiple times re diaper change but patient refused to be changed nor be checked if she is wet. She states\" I know when I am wet\" & I am wet now you don't have to intervene with that\". I will take my time. \"There's so much going on

## 2019-02-27 NOTE — PHYSICAL THERAPY NOTE
Pt with US + extensive DVT this AM.  Will hold PT at this time until clarification of activity recs as pt has been on heparin. Will f/u as appropriate.

## 2019-02-27 NOTE — CM/SW NOTE
Referral sent to [facility] palliative care d/t [facility] being out of network with insurance company.      Coty Davis, 02/27/19, 4:13 [phone]

## 2019-02-27 NOTE — PLAN OF CARE
L pleurx site noted with dressing intact,no bleeding ,pain & any complications noted. Patient denies having any pain on puncture site. No SOB,O2 sat 95% on RA.

## 2019-02-27 NOTE — PROGRESS NOTES
Pt AOx4 drowsy with complaints of moderate pain. Pt also noted new pain in her mouth behind her bottom front teeth. Upon assessmenat with a pen light a round pea sized bump was noted and a rough surface white spot noted next to it.  Pt reports no pain at th

## 2019-02-27 NOTE — CONSULTS
[facility]  Report of Consultation    Serene Morales Patient Status:  Inpatient    1955 MRN [id number]   [facility] 4NW-A Attending Tammy Riojas MD   Hosp Day # 6 PCP Rich Chen MD     Reason for Consultation:  New UE DVT, Laterality Date   • COLONOSCOPY  2007    nl per pt   • HYSTEROSCOPY  2001 or 2004    unsure of date   • OTHER SURGICAL HISTORY  4 2010    two teeth pulled   • OTHER SURGICAL HISTORY  1999    LEFT CYST REMOVED FROM OVARY laporoscopy   • PORT REMOVAL Right 0 PRN  •  influenza virus vaccine (FLUAD) ages 72 years and older inj 0.5ml, 0.5 mL, Intramuscular, Prior to discharge    Review of Systems:  A 10-point review of systems was done with pertinent positives and negatives per the HPI.     Physical Exam:   Blood parenchymal changes left lung base slightly. No pneumothorax. Dictated by: Catalina Horn MD on 2/26/2019 at 8:14     Approved by: Catalina Horn MD            Ir Pleurex Catheter    Result Date: 2/26/2019  CONCLUSION:  1.  Placement of a left P may need more procedures) but to convert back to Xarelto for CLARA or hospice as using Lovenox would be suboptimal in those settings and it is her preference not to use injections.    We noted that it is possible that her new LUE DVT represents anticoagulatio

## 2019-02-27 NOTE — PLAN OF CARE
Patient upto this moment hasn't taken xarelto due early pm.Patient insists she takes 15mg xarelto even after clarifications made with Dr Lynette Ortega.  Patient kept on asking to clarify dose with MD.Heparin still infusing at this time pending patient taking dos

## 2019-02-28 NOTE — OCCUPATIONAL THERAPY NOTE
Pt with US + extensive DVT this AM. Will hold OT at this time until clarification of activity recs as pt has been on heparin. Will f/u as appropriate.        Thank you for allowing me to care for this patient,   Marilu STORY, OTR/L   Occupational The

## 2019-02-28 NOTE — PROGRESS NOTES
[address] Patient Status:  Inpatient    1955 MRN [id number]   [facility] 4NW-A Attending Piero Torres MD   Hosp Day # 12 PCP [address] MD     Pulm / Critical Care Progress Note     S: pleurex drained toda 02/27/19   0423  02/28/19   0610   WBC  9.8  11.9*   --    --   10.8   HGB  8.4*  9.1*   --    --   8.5*   HCT  24.7*  27.1*   --    --   25.5*   PLT  307.0  310.0  357.0  366.0  358.0     No results for input(s): INR in the last 168 hours.       Recent Lab

## 2019-02-28 NOTE — PROGRESS NOTES
[facility] hospitalist daily note  Seen/examined on 2/27/19    S; DVT upper extremity. Pt on heparin, consulted hematology.  Pt denies significant pain or bleeding    Medications in Epic    PE vitals in Epic  Gen: awake, alert, no respiratory distress  HEENT;  ani

## 2019-02-28 NOTE — OCCUPATIONAL THERAPY NOTE
Attempted to see pt for skilled OT services this date.  Pt is currently refusing stating that she \"is planning out her day\" Pt reports that she needs to come up with burning questions for the doctors, see what doctors she needs to meet with and call her f

## 2019-02-28 NOTE — CM/SW NOTE
Interdisciplinary Rounds: 02/28/19  Admitted: 2/16/2019 LOS: 12  Disciplines in attendance: charge nurse, staff nurse, CM, [address] and discharge plan reviewed. Discharge to [facility] when medically cleared.     Active issues needing resolution pr

## 2019-02-28 NOTE — PHYSICAL THERAPY NOTE
Attempted for PT session this AM.  Pt declining therapy due to \"planning out my day. \"  Will attempt later today as schedule permits or later during hospital stay.

## 2019-02-28 NOTE — PLAN OF CARE
GASTROINTESTINAL - ADULT    • Maintains adequate nutritional intake (undernourished) Progressing        Pt. W/o c/o nausea. Cont. To have Poor appetite. Eating small amounts and drinking ensure. Pt's RUQ Pleur-x drained today. 450 ml removed.  Pt. Reports s

## 2019-03-01 NOTE — PROGRESS NOTES
[facility] hospitalist daily note  Seen/examined earlier in the day on 2/28/19     S;  Pt denies significant pain or bleeding. Does not want fentanyl patch.  Wants morphine IV (ordered), Norco PO (ordered)     Medications in Epic     PE vitals in Epic  Gen: awake,

## 2019-03-01 NOTE — PLAN OF CARE
GASTROINTESTINAL - ADULT    • Maintains adequate nutritional intake (undernourished) Progressing        Pt. Denies nausea today. Zofran made PRN. Pt. Eating small amounts of food. Mostly eating food brought in by her family. Passing flatus but No BM.  Repor

## 2019-03-01 NOTE — PHYSICAL THERAPY NOTE
PHYSICAL THERAPY TREATMENT NOTE - INPATIENT    Room Number: 430/430-A     Session: 5   Number of Visits to Meet Established Goals: 5    Presenting Problem: shortness of breath, metastatic uterine cancer    Problem List  Principal Problem:    Hydropneumoth MOBILITY  How much difficulty does the patient currently have. ..  -   Turning over in bed (including adjusting bedclothes, sheets and blankets)?: A Little   -   Sitting down on and standing up from a chair with arms (e.g., wheelchair, bedside commode, etc. demonstrate the required strength for safe ambulation at this time and will continue to benefit from skilled IP PT to progress toward goals.     DISCHARGE RECOMMENDATIONS  PT Discharge Recommendations: (ELOS 12-14 days)     PLAN  PT Treatment Plan: Bed mobi

## 2019-03-01 NOTE — CONSULTS
[facility] Cardiology Consultation    [place] Patient Status:  Inpatient    1955 MRN [id number]   [facility] Attending Matthew Ramirez MD   Hosp Day # 15 PCP Kiran Aguiar MD     Reason for Consultation:  hypotension      History DENI AC       Continuous Infusions:      Social History:   reports that she has quit smoking. she has never used smokeless tobacco. She reports that she does not drink alcohol or use drugs.     Review of Systems:  All systems were reviewed and are negative e results for input(s): INR in the last 168 hours.                   Lab Results   Component Value Date    TROP <0.045 02/26/2019    TROP <0.045 02/21/2019    TROP <0.045 02/21/2019         Invalid input(s): PBNPML                 Telemetry:     Laboratorie

## 2019-03-01 NOTE — PROGRESS NOTES
[address]  [place] Patient Status:  Inpatient    1955 MRN [id number]   [facility] 4NW-A Attending Alexis Rowan MD   [facility] Day # 15 PCP Tuyet Bates MD     Interval History--  No major changes in review • Endometrial cancer ([facility] [address] 07/2016   • Gall stones    • MENOPAUSE    • OBESITY    • OTHER DISEASES     eczema   • Other ovarian cysts    • Thyroid nodule    • Uterine cancer ([facility] [address] [facility]     2016   • [facility]     11/2017 / chemo finished 4/2018 / ct 30 mL, Oral, Daily PRN  •  bisacodyl (DULCOLAX) rectal suppository 10 mg, 10 mg, Rectal, Daily PRN  •  FLEET ENEMA (FLEET) 7-19 GM/118ML enema 133 mL, 1 enema, Rectal, Once PRN  •  Metoclopramide HCl (REGLAN) injection 10 mg, 10 mg, Intravenous, Q8H PRN  • Moy Munoz MD on 2/28/2019 at 11:36     Approved by: Chintan Santana MD              Impression and Plan:    Patient Active Problem List:     Encounter for routine gynecological examination     Screening for malignant neoplasm of the cervix     Menopause     Axillar

## 2019-03-01 NOTE — PROGRESS NOTES
[facility] hospitalist daily note  Seen/examined earlier in the day on 3/1/19     S;  still has some abd pain, pt's main complaint is that she does not want to be awakened at 2 am to be given pain medication     Medications in Epic     PE    03/01/19  1300   BP: 3/2/19     Va Boudreaux MD  [facility] hospitalist  [phone]

## 2019-03-01 NOTE — PROGRESS NOTES
[address] Patient Status:  Inpatient    1955 MRN [id number]   [facility] 4NW-A Attending Tammy Ivy MD   [address] Day # 15 PCP Harshal Vargas MD     Pulm / Critical Care Progress Note     S: pt states that she f 357.0  366.0  358.0     No results for input(s): INR in the last 168 hours.       Recent Labs   Lab  02/25/19   0620  02/25/19   1258  02/28/19   0610   NA  144  144  142   K  4.1  4.2  4.6   CL  109*  109*  108*   CO2  29.0  27.0  27.0   BUN  17  16  28*

## 2019-03-01 NOTE — DIETARY NOTE
[facility]    NUTRITION FOLLOW UP ASSESSMENT    Pt meets severe malnutrition criteria.     CRITERIA FOR MALNUTRITION DIAGNOSIS:  Criteria for severe malnutrition diagnosis: chronic illness related to wt loss greater than 10% in 6 months, energy intake able. Continue to encourage family to bring in favorite foods from home to maximize po intake. 2/22-poor appetite and po intake continues, pt not eating much. Will continue ensure compact BID, encourage po intake of meals and supplements as pt able. (1.5-2.0 grams protein per kg)  Fluid: ~1 ml/kcal or per MD discretion    MONITOR AND EVALUATE/NUTRITION GOALS:    1. PO intake to meet at least 75% patient nutrition prescription  2. At least 75% intake of oral supplements  3.  No signs of skin breakdown

## 2019-03-01 NOTE — PLAN OF CARE
GASTROINTESTINAL - ADULT    • Maintains adequate nutritional intake (undernourished) Not Progressing        Impaired Functional Mobility    • Achieve highest/safest level of mobility/gait Not Progressing          HEMATOLOGIC - ADULT    • Maintains hematolo

## 2019-03-01 NOTE — CM/SW NOTE
BANG sent updated clinicals to [facility] via [place]. Not sure when pt will dc at this time.

## 2019-03-02 NOTE — PLAN OF CARE
Impaired Functional Mobility    • Achieve highest/safest level of mobility/gait Not Progressing          MUSCULOSKELETAL - ADULT    • Return mobility to safest level of function Progressing        RESPIRATORY - ADULT    • Achieves optimal ventilation and o

## 2019-03-02 NOTE — PROGRESS NOTES
[address] Cardiology  Progress Note    Chyna Mercado Patient Status:  Inpatient    1955 MRN [id number]   [facility] 4NW-A Attending Sara Delacruz MD   Hosp Day # 14 PCP Anneliese Oseguera MD     Impression:  1.  Hypotensi internal jugular through the distal brachial veins. COMPRESSION:  Lack of compressibility involves the deep venous system with thrombus. There is compressibility involving the cephalic veins. The basilic veins are not visualized.   OTHER:  Negative.  === Prochlorperazine Maleate (COMPAZINE) tab 10 mg 10 mg Oral Q6H PRN   Heparin Lock Flush 100 UNIT/ML lock flush 500 Units 5 mL Intercatheter PRN   ALPRAZolam (XANAX) tab 0.25 mg 0.25 mg Oral Once   morphINE sulfate (PF) 4 MG/ML injection 2 mg 2 mg Intraven

## 2019-03-02 NOTE — PROGRESS NOTES
[facility] hospitalist daily note     S; sitting in chair. Feels better, apin ok. Eating a little, some nausea but better c meds. Discussed drain/pleurex.      Medications in Epic     PE    03/02/19  1448   BP: (!) 83/48   Pulse: 112   Resp: 18   Temp: 97.7 °F (36 Nayeli Casey MD  [facility] Hospitalist  [phone]  3/2/2019  5:26 PM

## 2019-03-03 NOTE — PROGRESS NOTES
[address] [facility] Cardiology  Progress Note    Deena Ludivina Patient Status:  Inpatient    1955 MRN [id number]   [facility] 4NW-A Attending Maggy West MD   Hosp Day # 15 PCP Owen Llanes MD     Impression:  1.  Hypotensi Systolic function was normal. The estimated ejection fraction was 65-70%.      There was no diagnostic evidence for regional wall motion abnormalities.     Doppler parameters are consistent with abnormal left ventricular     relaxation - grade 1 diastolic d Encounters:  03/03/19 : 172 lb (78 kg)  02/15/19 : 188 lb 11.2 oz (85.6 kg)  02/01/19 : 187 lb (84.8 kg)      General: Awake and alert; in no acute distress  Cardiac: Regular rate and regular rhythm; I/VI Murmur noted at LSB  No rubs or gallops are appreci 0.5ml 0.5 mL Intramuscular Prior to discharge       Laboratory Data:     Lab Results   Component Value Date    INR 1.36 (H) 02/17/2019    INR 1.55 (H) 02/16/2019    INR 1.37 (H) 02/10/2019          Telemetry: No malignant tachyarrhythmias or bradyarrhythmi

## 2019-03-03 NOTE — PROGRESS NOTES
Resting quietly, voices no c/o pain or distress. Pleurex catheter to left chest and right abdomen, both to be drained on Monday. Remains on Tele, SR/ST, continues on midodrine TID, comfortable, will continue to monitor.

## 2019-03-04 NOTE — PROGRESS NOTES
[facility] hospitalist daily note     S; laying in bed, new pain in vaginal area from excoriation. Nausea ok.     Medications in Epic     PE    03/04/19  1252   BP: 108/67   Pulse: 111   Resp: 20   Temp: 97.9 °F (36.6 °C)     Gen: awake, alert, no respiratory dist helping c nausea    Dispo; overall prognosis is poor.  Oncology (see Dr. Gabby Garcia note) advised hospice    DC planning    D/w pt/sister and RN Audrey Mathur and [address], MD  [facility] Hospitalist  [phone]  3/4/2019  2:19 PM

## 2019-03-04 NOTE — PROGRESS NOTES
Resting quietly, medicated for c/o pain to left chest, alert and oriented x 4, hypotensive, continues on midodrine, asymptomatic, pleurex drains to left chest and right abdomen to be drained today. Comfortable, voices no complaints, continue to monitor.

## 2019-03-04 NOTE — PROGRESS NOTES
[address] Patient Status:  Inpatient    1955 MRN [id number]   [facility] 4NW-A Attending Krissy Jaramillo MD   [facility] Day # 12 PCP Orinda Carrel, MD     SUBJECTIVE: Pt remains relatively hypotensive, largely a 10 mg, 10 mg, Rectal, Daily PRN  •  FLEET ENEMA (FLEET) 7-19 GM/118ML enema 133 mL, 1 enema, Rectal, Once PRN  •  Metoclopramide HCl (REGLAN) injection 10 mg, 10 mg, Intravenous, Q8H PRN  •  influenza virus vaccine (FLUAD) ages 72 years and older inj 0.5ml tube placed 2/16 by IR with resolution of PTX but continued high output of pleural fluid  - now s/p pleur-x catheter, draining 2x/week, scheduled to drain today   2.  Dyspnea, chest pain: 2/2 malignant effusion/trapped lung    - repeat echo 3/2 unchanged fr

## 2019-03-04 NOTE — CM/SW NOTE
Per MD Henry Graft note, pt may be appropriate for hospice. Received a call from [facility] stating the pt can no longer return because \"they can not manage her. \" Not sure what the pt will need. Will follow up.

## 2019-03-04 NOTE — CONSULTS
[address] Patient Status:  Inpatient    1955 MRN [id number]   [facility] 4NW-A Attending Josy Valdez MD   [address] Day # 12 PCP Charlette Nunez MD     Date of Consult: 3/4/20 Diverticulosis 08/2018   • [facility] 07/2016   • Gall stones    • MENOPAUSE    • OBESITY    • OTHER DISEASES     eczema   • Other ovarian cysts    • Thyroid nodule    • [facility]     2016   • Uterine cancer ([facility]     11/2017 / Kameron Azar participate in physical therapy, increase her activity and consume protein rich meals so she can evaluate what she can handle physically and how her body is going to respond.  She believes her body will tell her when she can not take any more medical treatm Units, 5 mL, Intercatheter, PRN  •  ALPRAZolam (XANAX) tab 0.25 mg, 0.25 mg, Oral, Once  •  morphINE sulfate (PF) 4 MG/ML injection 2 mg, 2 mg, Intravenous, Q3H PRN  •  acetaminophen (TYLENOL) tab 650 mg, 650 mg, Oral, Q6H PRN  •  HYDROcodone-acetaminophen 20, weight 173 lb 3.2 oz (78.6 kg), SpO2 94 %, not currently breastfeeding. Body mass index is 31.68 kg/m².   Present Level of pain: denies pain  Non-verbal signs of pain present:  NO    Physical Exam:  General: awake and in no  apparent distress, appears sister  is the HCPOA      I will continue to follow. Thank you for allowing Palliative Care services to participate in the care of . Community palliative care is appropriate at discharge.      A total of   were spent on this consult, which

## 2019-03-04 NOTE — PROGRESS NOTES
[facility] [address] Cardiology Progress Note        Aidee Benites Patient Status:  Inpatient    1955 MRN [id number]   [facility] 4NW-A Attending Camille Plascencia MD   Hosp Day # 16 PCP Lashay Morataya MD     Subjective 02/25/19   1258  02/26/19   0538  02/27/19   0423  02/28/19   0610   WBC  11.9*   --    --   10.8   HGB  9.1*   --    --   8.5*   HCT  27.1*   --    --   25.5*   PLT  310.0  357.0  366.0  358. 0       Chem:  Recent Labs   Lab  02/25/19   1258  02/26/19   05 was moderate regurgitation. 5. Pulmonary arteries: Systolic pressure was mildly increased, in the range     of 35mm Hg to 40mm Hg. Estimated pulmonary artery diastolic pressure was     10mm Hg. 6. Pericardium, extracardiac:  There was a left pleural effus

## 2019-03-04 NOTE — PROGRESS NOTES
[facility] hospitalist daily note     S; laying in bed, was napping. Eating little. No sob. +u/o. No Lh/dizziness. Had BM. Dec energy.      Medications in Epic     PE    03/03/19  1708   BP: (!) 88/50   Pulse: 103   Resp: 18   Temp: 97.6 °F (36.4 °C)     Gen: awak

## 2019-03-05 NOTE — PROGRESS NOTES
Matt [facility] Cardiology Progress Note        Marilyn Tao Patient Status:  Inpatient    1955 MRN [id number]   [facility] 4NW-A Attending Ruth Dong MD   Hosp Day # 16 PCP Mitch Rosales MD     Subjective Lab  02/27/19   0423  02/28/19   0610   WBC   --   10.8   HGB   --   8.5*   HCT   --   25.5*   PLT  366.0  358. 0       Chem:  Recent Labs   Lab  02/28/19   0610   NA  142   K  4.6   CL  108*   CO2  27.0   BUN  28*   CREATSERUM  0.84   CA  9.3   GLU  90 Likely 2/2 to low oncotic pressure due to ascites and pleural fluid drainage. Minimally if at all symptomatic. 2. Hydropneumothorax            --left chest pleur-x; right abd pleur-x  2.  Endometrial cancer ([facility] [address]            -- follows with

## 2019-03-05 NOTE — CM/SW NOTE
LOS # 17 days~ admitted with Hydropneumothorax. Noted with Pleur-x to left lung and Pleurx drain to abdomen for abdominal drainage. The [facility] will not accept patient back d/t medical complexity. Caryle Petties is upset about [facility].  [facility]

## 2019-03-05 NOTE — PLAN OF CARE
Impaired Functional Mobility    • Achieve highest/safest level of mobility/gait Not Progressing        MUSCULOSKELETAL - ADULT    • Return mobility to safest level of function Not Progressing          RESPIRATORY - ADULT    • Achieves optimal ventilation a

## 2019-03-05 NOTE — PROGRESS NOTES
[address] Alaina Hammond Patient Status:  Inpatient    1955 MRN [id number]   [facility] [place] 4NW-A Attending Mariola Murray MD   [address] Day # 16 PCP Amandeep Durán MD     Date of Consult: 3/5/2019  Pa mg, 4 mg, Oral, Q8H PRN  •  Senna (SENOKOT) tab 8.6 mg, 8.6 mg, Oral, Daily PRN  •  magnesium hydroxide (MILK OF MAGNESIA) 400 MG/5ML suspension 30 mL, 30 mL, Oral, Daily PRN  •  bisacodyl (DULCOLAX) rectal suppository 10 mg, 10 mg, Rectal, Daily PRN  •  F affect  Psychiatric: Mood- calm and cooperative  Skin: Warm and dry. Palliative Performance Scale : 30  %    Palliative Care Goals of Care: This morning over the phone a discussion was had with Roma Forbes, the patient's sister.  She was informed the Sri patient/family today: Yes    Patient's preference about sharing medical information:Ta braun Records  Patient's decision making preferences: herself  Code status: FULL CODE  Have advanced directives been discussed with patient and healthcare power of attorne

## 2019-03-05 NOTE — DIETARY NOTE
[facility]    NUTRITION FOLLOW UP ASSESSMENT    Pt meets severe malnutrition criteria.     CRITERIA FOR MALNUTRITION DIAGNOSIS:  Criteria for severe malnutrition diagnosis: chronic illness related to wt loss greater than 10% in 6 months, energy intake concern for the pt's nutrition. Pt's sister very concerned with the pt's poor po intake, states that she is not eating \"anything\" and is asking about feeding tube. RD spoke to hospitalist about this, MD to address with sister and pt.  Will follow up for P 105.7 kg (233 lb)  08/28/18 : 107 kg (236 lb)  08/14/18 : 107 kg (236 lb)    NUTRITION:  Diet: general  Oral Supplements: ensure compact    FOOD/NUTRITION RELATED HISTORY:  Appetite: Poor  Intake: <75%  Intake Meeting Needs: No  Food Allergies: No  [place]

## 2019-03-05 NOTE — PROGRESS NOTES
[address] Patient Status:  Inpatient    1955 MRN [id number]   [facility] 4NW-A Attending Gary Reardon MD   Hosp Day # 16 PCP Donny Calles MD     SUBJECTIVE: Pt with 400cc removed from pleurX catheter y 400 MG/5ML suspension 30 mL, 30 mL, Oral, Daily PRN  •  bisacodyl (DULCOLAX) rectal suppository 10 mg, 10 mg, Rectal, Daily PRN  •  FLEET ENEMA (FLEET) 7-19 GM/118ML enema 133 mL, 1 enema, Rectal, Once PRN  •  Metoclopramide HCl (REGLAN) injection 10 mg, 1 2. Dyspnea, chest pain: 2/2 malignant effusion/trapped lung    - repeat echo 3/2 unchanged from previous   - already on anticoagulation, so PE is less likely, though noted that pt developed worsened LUE clot despite anticoagulation.   3. Malignant ascites

## 2019-03-06 NOTE — PROGRESS NOTES
[facility] hospitalist daily note     S; laying in bed, reports \"pain all over\". Napping.      Medications in Epic     PE    03/05/19  1726   BP: 93/55   Pulse: 79   Resp: 14   Temp: 97.7 °F (36.5 °C)     Gen: awake, alert, no respiratory distress  HEENT;  anict Bhupendra Mendez RD, pt's sister discussed with her feeding tube option; I d/w pt, given her overall condition (with ascites as well), I do not think this would be a good option and may carry risks; also likely will not improve her quality of life;pt seems to under

## 2019-03-06 NOTE — OCCUPATIONAL THERAPY NOTE
Chart reviewed for acute OT this date. Per chart, pt deciding on hospice at this time. D/w [facility], [address] has been minimally cooperative with OT with plan to return to [facility], plan now likely hospice care.  Will dc from acute OT as not consistent with end of l

## 2019-03-06 NOTE — CM/SW NOTE
BANG spoke w/Lashanda from [facility] who stated the sister was interested in speaking w[facility]. BANG called Haris and informed her of this. Haris meeting w/pt's sister at this time. Will follow up. Sabi from [place] stated the pt is not GIP appropriate.

## 2019-03-06 NOTE — PLAN OF CARE
GASTROINTESTINAL - ADULT    • Maintains adequate nutritional intake (undernourished) Not Progressing        Impaired Functional Mobility    • Achieve highest/safest level of mobility/gait Not Progressing        MUSCULOSKELETAL - ADULT    • Return mobility

## 2019-03-06 NOTE — PROGRESS NOTES
[address] Crook Patient Status:  Inpatient    1955 MRN [id number]   [facility] 4NW-A Attending Bianca Mai MD   [facility] Day # [address] MD     Date of Consult: 3/6/2019  Pa (DULCOLAX) rectal suppository 10 mg, 10 mg, Rectal, Daily PRN  •  FLEET ENEMA (FLEET) 7-19 GM/118ML enema 133 mL, 1 enema, Rectal, Once PRN  •  Metoclopramide HCl (REGLAN) injection 10 mg, 10 mg, Intravenous, Q8H PRN  •  influenza virus vaccine (FLUAD) age to ask and answer questions appropriately   Sad, somber  affect  Skin: Warm and dry. Palliative Performance Scale :  20%    Palliative Care Goals of Care:    A discussion was had with Pita Cruz and her sister, RON related to her change in symptoms.  Rani sister Aida Rivera. Emotion support provided to patient/family today: Yes      Patient's preference about sharing medical information: Sister Aida Rivera    Code status: DNR  Have advanced directives been discussed with patient and healthcare power of :  Betzaida Mondragon

## 2019-03-06 NOTE — PHYSICAL THERAPY NOTE
Chart reviewed for acute PT this date. Per chart, pt deciding on hospice at this time. D/w [facility], [address] [facility] has been minimally cooperative with PT with plan to return to [facility], plan now likely hospice care.   Will dc from acute PT as not consistent with end o

## 2019-03-06 NOTE — PROGRESS NOTES
Pulmonary Progress Note        NAME: Halina Santos - ROOM: 430/430-A - MRN: [id number] - Age: 71year old - : 1955        Last 24hrs: No events overnight, resting comfortably in bed    OBJECTIVE:   19  1726 19  2148 19  2241 03 08:20 AM 3.150 0.450 - 4.500 uIU/mL Final   02/02/2008 11:28 AM 2.121 0.350 - 5.500 uIU/mL Final     Albumin   Date/Time Value Ref Range Status   03/01/2019 09:48 AM 2.1 (L) 3.4 - 5.0 g/dL Final   04/15/2011 08:20 AM 4.0 3.5 - 5.5 g/dL Final         ASSESS

## 2019-03-06 NOTE — PROGRESS NOTES
Matt [facility] Cardiology Progress Note        [place] Cam Patient Status:  Inpatient    1955 MRN [id number]   [facility] 4NW-A Attending Brian Vogel MD   Hosp Day # 18 PCP Kiran Aguiar MD     Subjective Labs   Lab  02/28/19   0610   WBC  10.8   HGB  8.5*   HCT  25.5*   PLT  358.0       Chem:  Recent Labs   Lab  02/28/19   0610   NA  142   K  4.6   CL  108*   CO2  27.0   BUN  28*   CREATSERUM  0.84   CA  9.3   GLU  90       Recent Labs   Lab  02/28/19   06 pleural fluid drainage. Minimally if at all symptomatic. Overall trend suggests modest positive response to midodrine. 2. Hydropneumothorax            --left chest pleur-x; right abd pleur-x  2.  Endometrial cancer ([facility] [address]            -- follows

## 2019-03-07 PROBLEM — C55 UTERINE CANCER (HCC): Status: ACTIVE | Noted: 2019-01-01

## 2019-03-07 NOTE — PROGRESS NOTES
03/06/19 2134   Clinical Encounter Type   Visited With Patient and family together   Continue Visiting No   Referral From Family   Referral To    Patient Spiritual Encounters   Spiritual Interventions  was requested by family member Osorio Shaw

## 2019-03-07 NOTE — PROGRESS NOTES
[address] [facility]  [id number]  Hospital Day #19  Date of Consult:    Patient seen at: [address] [facility]:      Patient was seen and examined with her sister, Latonia Otto, suspension 30 mL, 30 mL, Oral, Daily PRN  •  bisacodyl (DULCOLAX) rectal suppository 10 mg, 10 mg, Rectal, Daily PRN  •  FLEET ENEMA (FLEET) 7-19 GM/118ML enema 133 mL, 1 enema, Rectal, Once PRN  •  Metoclopramide HCl (REGLAN) injection 10 mg, 10 mg, Intra Warm and dry.     Palliative Performance Scale: 20%    Palliative Performance Scale   % Ambulation Activity Level Self-Care Intake Consciousness   100 Full Normal Full   Normal Full   90 Full No disease  Normal Full Normal Full   80 Full Some disease  Dane Mccord care. I explained we can not predict the human body's behavior. We do know over the past week she has declined quickly and has not eaten or taken in oral liquid for several days all signs leading toward the end is nearing.  She has also demonstrated behavio Care  Phone [phone]  3/7/2019  10:16 AM

## 2019-03-07 NOTE — PROGRESS NOTES
[facility] hospitalist daily note     S; laying in bed, having  Pain, initiating hospice.     Medications in Epic     PE    03/06/19  0601   BP: 119/86   Pulse: 105   Resp: 16   Temp: 97.5 °F (36.4 °C)     Gen: awake, alert, mild distress appearing from pain  HEEN RD, pt's sister discussed with her feeding tube option; I d/w pt, given her overall condition (with ascites as well), I do not think this would be a good option and may carry risks; also likely will not improve her quality of life;pt seems to understand

## 2019-03-07 NOTE — DISCHARGE SUMMARY
General Medicine Discharge Summary     Patient ID:  Marilyn Tao  71year old  1/21/1955    Admit date: 2/16/2019    Discharge date and time: 3/7/2019  3:59 PM     Attending Physician: Cynthia jane. Elisha Nixon physicians  - Consults: Pulm, hem/onc    Follow Up Items:  None. Hospital Course:      71 yr old female with hx of metastatic uterine CA presented with hydro/PTX, chest tube placed. Then converted to pleurex. Pulm consulted.   Cardiology consulted fo

## 2019-03-07 NOTE — HOSPICE RN NOTE
Consents were signed for hospice today. Pt's sister [facility] had questions about Romelia's current meds; questions addressed and she in agreement with discontinuing current meds.   POC reviewed with Dr. Julianne Pablo, attending and Dr. Mega Michael, [facility] DR KLARISSA MORALES

## 2019-03-08 NOTE — CM/SW NOTE
SW checked in on status of pt . Pt declining overnight . Son here and sw talked with him to offer support. Called sister to inform of decline and she stated that she will be here shortly.

## 2019-03-08 NOTE — HOSPICE RN NOTE
MARILU salas2. Pt. appears as though she is unresponsive but nods head \"no\" when asked if she is having pain. MS infusing at 1mg/hr. RR 6, no congestion, no accessory muscle use. Abdomen soft.   Reviewed POC with Dr. Steward Kussmaul related to draining pleurex; jarod

## 2019-03-08 NOTE — PROGRESS NOTES
On hospice care,started on morphine gtt at 1 mg/hr. refused to eat,family at bedside,repositioned and made comfortable.

## 2019-03-08 NOTE — H&P
D[facility] Hospitalist History and Physical      CC:  Hospice admission      PCP: Benson Humphrey MD      History of Present Illness: Patient is a 71year old female with PMH sig for metastatic uterine CA presented with hydro/PTX, chest tube placed.   Then converted t Location: Right arm)   Pulse 104   Temp 98.9 °F (37.2 °C) (Oral)   Resp 18   SpO2 98%     General: Minimally respondsive.  +agonal breathing Heart: Tachy, reg,  Lungs: clear bilaterally, no active wheezing  Abdomen: nontender, nondistended, intact BS  Extr IMAGING (CPT=32557)  COMPARISON:  [facility] , XR CHEST AP PORTABLE  (CPT=71045), 2/16/2019, 21:02. [facility] , XR CHEST AP PORTABLE  (CPT=71045), 2/16/2019, 15:27. [facility] , XR CHEST AP PORTABLE  (CPT=71045), 2/18/2019, 5:27.   INDICATIONS:  Hydropneumothorax  T (cpt=71045)    Result Date: 2/28/2019  PROCEDURE:  XR CHEST AP PORTABLE  (CPT=71045)  TECHNIQUE:  AP chest radiograph was obtained. COMPARISON:  [facility] , XR CHEST AP PORTABLE  (CPT=71045), 2/26/2019, 14:42.   [facility] , XR CHEST AP PORTABLE  (CPT=71045), 2/2 2/21/2019, 6:00. [facility] , XR CHEST AP PORTABLE  (CPT=71045), 2/20/2019, 12:37. INDICATIONS:  assess left sided pleural effusion  PATIENT STATED HISTORY: (As transcribed by Technologist)  Patient offered no additional history at this time.     FINDINGS:  T 2/20/2019  PROCEDURE:  XR CHEST AP PORTABLE  (CPT=71045)  TECHNIQUE:  AP chest radiograph was obtained. COMPARISON:  [facility] , XR CHEST AP PORTABLE  (CPT=71045), 2/19/2019, 15:39. [facility] , XR CHEST AP PORTABLE  (CPT=71045), 2/19/2019, 8:47.   INDICATIONS: upper lobe appears to be slightly hyperinflated. Cardiac size is normal.  There is a CT power port with catheter tip projecting over the mid SVC. CONCLUSION:  No significant pneumothorax is identified. Chest tube is stable in position.   Sizable pleu mild bilateral persistent pleural effusion. No other significant changes .     Dictated by: Celia Stahl MD on 2/18/2019 at 7:26     Approved by: Celia Stahl MD            Xr Chest Ap Portable  (cpt=71045)    Result Date: 2/16/2019  PROCEDURE:  XR C radiograph was obtained. COMPARISON:  [facility] , XR CHEST AP PORTABLE  (CPT=71045), 2/05/2019, 20:07. INDICATIONS:  s/p L thora, r/o ptx  PATIENT STATED HISTORY: (As transcribed by Technologist)  Patient offered no additional history at this time.     Arturo Tiwari position a guidewire was advanced under fluoroscopy. The tract was dilated followed with placement of the peel-away sheath system for a PleurX catheter. Attention was then turned to create an anteriorly positioned subcutaneous tract.   Once this was accom a very trace amount of pleural effusion was present. This was felt to be below the limits of safety for tunneled pleural catheter placement. No intervention was performed.       CONCLUSION:  There is only a trace amount of pleural effusion present within pocket of ascites in the right lower quadrant. The needle was removed. A 0.035 guidewire was advanced into the peritoneal cavity. The sheath was removed. Sequential skin dilatation was performed. A peel-away sheath was then placed over the guidewire.  The lisbeth

## 2019-03-08 NOTE — PLAN OF CARE
Appears comfortable in bed,remains on Morphine drip at continuous rate of 2mg/hr as requested by family who are at the bedside. Nods when spoken to but with agonal breaths,RR 8/min. Turned to sides. Comfort measure provided. Kept comfortable.

## 2019-03-08 NOTE — PROGRESS NOTES
Slept quietly and comfortably thru the night, breathing regular, secretions lessened. PCA turned back down to 1 mg continuously per family's request, will continue to monitor.

## 2019-03-09 NOTE — DISCHARGE SUMMARY
General Medicine Discharge Summary     Patient ID:  Montana Cam  71year old  1/21/1955    Admit date: 3/7/2019    Discharge date and time: 3/8/2019  3:59 PM     Attending Physician: Serafin Holland metastatic uterine CA presented with hydro/PTX, chest tube placed. Then converted to pleurex. Pulm consulted. Cardiology consulted for hypotension, likely due to low oncotic pressure, midodrine started.   Unfortunately pt continued to decline, oncology r

## 2019-03-09 NOTE — PLAN OF CARE
NURSING DISCHARGE NOTE    Discharged Other, (see nursing note) via Cart. Accompanied by Support staff  Belongings Taken by patient/family. Pt received obtunded, agonally breathing resp 4 per min.  Sister at bedside, verbalizes that she feels the pat

## 2024-07-30 NOTE — PLAN OF CARE
CARDIOVASCULAR - ADULT    • Maintains optimal cardiac output and hemodynamic stability Not Progressing        Impaired Functional Mobility    • Achieve highest/safest level of mobility/gait Not Progressing          GASTROINTESTINAL - ADULT    • Minimal or Discharged

## 2024-09-18 NOTE — PROGRESS NOTES
Pulmonary Progress Note     Assessment / Plan:  1.  Left hydropneumothorax - effusion is malignant  - chest tube placed 2/16 by IR  - no PTX on CXR with tube clamped, chest tube maintained for increased output  - as CT output remains elevated would recommen f/u outpatient f/u outpatient lft wnl  f/u outpatient

## (undated) NOTE — LETTER
Consent to Procedure/Sedation    Date: 2/22/2019    Time: _______________    1. I authorize the performance upon Aidee Benites the following:    Left Pleurex Catheter Insertion    2.  I authorize Dr. Salvador Queen (and whomever is designated as the doctor’s assist Witness: ____________________     Date: ______________    Printed: 2019   3:15 PM    Patient Name: Nicolas Morales        : 1955       Medical Record #: [id number]

## (undated) NOTE — ED AVS SNAPSHOT
Janet Eldridge   MRN: [id number]    Department:  [facility] Emergency Department   Date of Visit:  11/23/2018           Disclosure     Insurance plans vary and the physician(s) referred by the ER may not be covered by your plan.  Please contact tell this physician (or your personal doctor if your instructions are to return to your personal doctor) about any new or lasting problems. The primary care or specialist physician will see patients referred from the [facility] Emergency Department.  Jo Ann Silva

## (undated) NOTE — ED AVS SNAPSHOT
[facility] Emergency Department    [place]  [address] [address]    Phone:  [phone]    Fax:  [phone] [facility]   MRN: [id number]    Department:  [facility] Emergency Department   Date of Visit:  2/ from our patient liason soon after your visit. Also, some patients receive a detailed feedback survey mailed to them a week after the visit. If you receive this, we would really appreciate it if you could take the time to complete it. Thank you!       [facility] ([address]) [facility] [address] [facility]. ([address]) [facility] [address] (Audra[address] Karishma Anderson [address]) [address]  Art Quentin ([employer] [address] TECHNIQUE:  Real time, grey scale, and duplex ultrasound was used to evaluate the upper extremity venous system. B-mode two-dimensional images of the vascular structures, Doppler spectral analysis, and color flow. Doppler imaging were performed.   The   fo

## (undated) NOTE — LETTER
Consent to Procedure/Sedation    Date: 2/26/2019    Time: 1010    1. I authorize the performance upon Jacqui Brown the following:    Left Pleurex Catheter Insertion    2.  I authorize Dr. Ean Knott (and whomever is designated as the doctor’s assistant), to pe Witness: ____________________     Date: ______________    Printed: 2019   10:09 AM    Patient Name: Los Mercedes        : 1955       Medical Record #: [id number]

## (undated) NOTE — LETTER
Consent to Procedure/Sedation    Date: 2/11/2019    Time: 1520    1. I authorize the performance upon Los Mercedes the following:    Abdominal Pleurex Catheter Insertion    2.  I authorize Dr. Anand Carpenter (and whomever is designated as the doctor’s assist Witness: ____________________     Date: ______________    Printed: 2019   3:19 PM    Patient Name: Mariluz Osborne        : 1955       Medical Record #: [id number]